# Patient Record
Sex: MALE | Race: WHITE | Employment: FULL TIME | URBAN - METROPOLITAN AREA
[De-identification: names, ages, dates, MRNs, and addresses within clinical notes are randomized per-mention and may not be internally consistent; named-entity substitution may affect disease eponyms.]

---

## 2017-03-27 PROCEDURE — 88305 TISSUE EXAM BY PATHOLOGIST: CPT | Performed by: INTERNAL MEDICINE

## 2017-05-31 PROCEDURE — 82652 VIT D 1 25-DIHYDROXY: CPT | Performed by: INTERNAL MEDICINE

## 2017-09-25 PROCEDURE — 88305 TISSUE EXAM BY PATHOLOGIST: CPT | Performed by: INTERNAL MEDICINE

## 2018-02-08 PROCEDURE — 82570 ASSAY OF URINE CREATININE: CPT | Performed by: INTERNAL MEDICINE

## 2018-02-08 PROCEDURE — 82043 UR ALBUMIN QUANTITATIVE: CPT | Performed by: INTERNAL MEDICINE

## 2018-03-14 PROBLEM — E55.9 VITAMIN D DEFICIENCY: Status: ACTIVE | Noted: 2018-03-14

## 2018-10-25 ENCOUNTER — APPOINTMENT (OUTPATIENT)
Dept: GENERAL RADIOLOGY | Age: 48
End: 2018-10-25
Attending: EMERGENCY MEDICINE
Payer: OTHER MISCELLANEOUS

## 2018-10-25 ENCOUNTER — HOSPITAL ENCOUNTER (EMERGENCY)
Age: 48
Discharge: HOME | End: 2018-10-25
Attending: EMERGENCY MEDICINE
Payer: OTHER MISCELLANEOUS

## 2018-10-25 VITALS
SYSTOLIC BLOOD PRESSURE: 152 MMHG | TEMPERATURE: 98 F | WEIGHT: 235 LBS | OXYGEN SATURATION: 96 % | DIASTOLIC BLOOD PRESSURE: 92 MMHG | RESPIRATION RATE: 18 BRPM | HEART RATE: 87 BPM | BODY MASS INDEX: 31.14 KG/M2 | HEIGHT: 73 IN

## 2018-10-25 DIAGNOSIS — S90.32XA CONTUSION OF LEFT FOOT, INITIAL ENCOUNTER: Primary | ICD-10-CM

## 2018-10-25 PROCEDURE — 73630 X-RAY EXAM OF FOOT: CPT | Performed by: EMERGENCY MEDICINE

## 2018-10-25 PROCEDURE — 99283 EMERGENCY DEPT VISIT LOW MDM: CPT

## 2018-10-25 NOTE — ED INITIAL ASSESSMENT (HPI)
Left foot injury on Monday;  Pt was at work and reported that he dropped a pipe on foot; Pain is not improving; +CMS, +bruising and swelling

## 2018-10-25 NOTE — ED PROVIDER NOTES
Patient Seen in: [facility] Emergency Department In [place]    History   Patient presents with:  Lower Extremity Injury (musculoskeletal)    Stated Complaint: left foot injury while at work    HPI    Patient is a 52-year-old male who presents emergency ro 10/28/2015    Performed by Katherine Alonso MD at [facility] ENDOSCOPY   • ESOPHAGOGASTRODUODENOSCOPY, POSSIBLE BIOPSY, POSSIBLE POLYPECTOMY 28591 N/A 3/27/2017    Performed by Katherine Alonso MD at [address] [place] [address]   • ESOPHAGOGASTRODUODENOSCOPY, [address] [place] [address] 87   Temp 98.4 °F (36.9 °C) (Oral)   Resp 18   Ht 185.4 cm (6' 1\")   Wt 106.6 kg   SpO2 96%   BMI 31.00 kg/m²         Physical Exam  GENERAL: Well-developed, well-nourished male sitting up breathing easily in no apparent distress.   Patient is nontoxic in FOR FOLLOW UP THIS WEEK    Serafin Dandy, MD [place] [phone]    Call in 2 days  [phone]        Medications Prescribed:  Discharge Medication List as of 10/25/2018 10:44 AM

## 2018-12-19 PROCEDURE — 86704 HEP B CORE ANTIBODY TOTAL: CPT | Performed by: INTERNAL MEDICINE

## 2018-12-19 PROCEDURE — 82103 ALPHA-1-ANTITRYPSIN TOTAL: CPT | Performed by: INTERNAL MEDICINE

## 2018-12-19 PROCEDURE — 82390 ASSAY OF CERULOPLASMIN: CPT | Performed by: INTERNAL MEDICINE

## 2018-12-19 PROCEDURE — 82104 ALPHA-1-ANTITRYPSIN PHENO: CPT | Performed by: INTERNAL MEDICINE

## 2018-12-19 PROCEDURE — 86708 HEPATITIS A ANTIBODY: CPT | Performed by: INTERNAL MEDICINE

## 2018-12-19 PROCEDURE — 86256 FLUORESCENT ANTIBODY TITER: CPT | Performed by: INTERNAL MEDICINE

## 2018-12-19 PROCEDURE — 86803 HEPATITIS C AB TEST: CPT | Performed by: INTERNAL MEDICINE

## 2018-12-19 PROCEDURE — 83516 IMMUNOASSAY NONANTIBODY: CPT | Performed by: INTERNAL MEDICINE

## 2018-12-19 PROCEDURE — 86706 HEP B SURFACE ANTIBODY: CPT | Performed by: INTERNAL MEDICINE

## 2019-01-09 PROCEDURE — 82043 UR ALBUMIN QUANTITATIVE: CPT | Performed by: INTERNAL MEDICINE

## 2019-01-09 PROCEDURE — 82570 ASSAY OF URINE CREATININE: CPT | Performed by: INTERNAL MEDICINE

## 2019-01-09 PROCEDURE — 36415 COLL VENOUS BLD VENIPUNCTURE: CPT | Performed by: INTERNAL MEDICINE

## 2019-01-11 PROCEDURE — 88305 TISSUE EXAM BY PATHOLOGIST: CPT | Performed by: INTERNAL MEDICINE

## 2019-09-30 PROCEDURE — 88307 TISSUE EXAM BY PATHOLOGIST: CPT | Performed by: INTERNAL MEDICINE

## 2019-09-30 PROCEDURE — 88313 SPECIAL STAINS GROUP 2: CPT | Performed by: INTERNAL MEDICINE

## 2019-10-25 PROBLEM — Z79.899 ENCOUNTER FOR LONG-TERM (CURRENT) USE OF MEDICATIONS: Status: ACTIVE | Noted: 2019-10-25

## 2019-10-25 PROBLEM — M19.90 CHRONIC INFLAMMATORY ARTHRITIS: Status: ACTIVE | Noted: 2019-10-25

## 2021-07-06 PROBLEM — M19.90 CHRONIC INFLAMMATORY ARTHRITIS: Status: RESOLVED | Noted: 2019-10-25 | Resolved: 2021-07-06

## 2021-07-06 PROBLEM — M06.9 RHEUMATOID ARTHRITIS IN REMISSION (HCC): Status: ACTIVE | Noted: 2021-07-06

## 2021-07-06 PROBLEM — Z79.899 ENCOUNTER FOR LONG-TERM (CURRENT) USE OF MEDICATIONS: Status: RESOLVED | Noted: 2019-10-25 | Resolved: 2021-07-06

## 2022-03-23 PROBLEM — K22.710 BARRETT'S ESOPHAGUS WITH LOW GRADE DYSPLASIA: Status: ACTIVE | Noted: 2022-03-23

## 2022-03-23 PROBLEM — K21.00 GASTROESOPHAGEAL REFLUX DISEASE WITH ESOPHAGITIS WITHOUT HEMORRHAGE: Status: ACTIVE | Noted: 2022-03-23

## 2022-03-23 PROBLEM — K22.711 BARRETT'S ESOPHAGUS WITH HIGH GRADE DYSPLASIA: Status: ACTIVE | Noted: 2022-03-23

## 2022-04-01 ENCOUNTER — LAB ENCOUNTER (OUTPATIENT)
Dept: LAB | Age: 52
End: 2022-04-01
Attending: INTERNAL MEDICINE
Payer: COMMERCIAL

## 2022-04-01 DIAGNOSIS — Z79.899 ENCOUNTER FOR LONG-TERM (CURRENT) USE OF MEDICATIONS: ICD-10-CM

## 2022-04-01 DIAGNOSIS — M19.90 CHRONIC INFLAMMATORY ARTHRITIS: ICD-10-CM

## 2022-04-01 DIAGNOSIS — K21.00 GASTROESOPHAGEAL REFLUX DISEASE WITH ESOPHAGITIS WITHOUT HEMORRHAGE: ICD-10-CM

## 2022-04-02 LAB — SARS-COV-2 RNA RESP QL NAA+PROBE: NOT DETECTED

## 2022-04-04 ENCOUNTER — ANESTHESIA EVENT (OUTPATIENT)
Dept: ENDOSCOPY | Facility: HOSPITAL | Age: 52
End: 2022-04-04
Payer: COMMERCIAL

## 2022-04-04 ENCOUNTER — ANESTHESIA (OUTPATIENT)
Dept: ENDOSCOPY | Facility: HOSPITAL | Age: 52
End: 2022-04-04
Payer: COMMERCIAL

## 2022-04-04 ENCOUNTER — HOSPITAL ENCOUNTER (OUTPATIENT)
Facility: HOSPITAL | Age: 52
Setting detail: HOSPITAL OUTPATIENT SURGERY
Discharge: HOME | End: 2022-04-04
Attending: INTERNAL MEDICINE | Admitting: INTERNAL MEDICINE
Payer: COMMERCIAL

## 2022-04-04 VITALS
WEIGHT: 255 LBS | DIASTOLIC BLOOD PRESSURE: 70 MMHG | RESPIRATION RATE: 18 BRPM | OXYGEN SATURATION: 96 % | HEART RATE: 85 BPM | SYSTOLIC BLOOD PRESSURE: 135 MMHG | BODY MASS INDEX: 33.8 KG/M2 | TEMPERATURE: 98 F | HEIGHT: 73 IN

## 2022-04-04 DIAGNOSIS — K21.00 GASTROESOPHAGEAL REFLUX DISEASE WITH ESOPHAGITIS WITHOUT HEMORRHAGE: Primary | ICD-10-CM

## 2022-04-04 DIAGNOSIS — K22.719 BARRETT'S ESOPHAGUS WITH DYSPLASIA: ICD-10-CM

## 2022-04-04 LAB — GLUCOSE BLD-MCNC: 121 MG/DL (ref 70–99)

## 2022-04-04 PROCEDURE — 0DB38ZX EXCISION OF LOWER ESOPHAGUS, VIA NATURAL OR ARTIFICIAL OPENING ENDOSCOPIC, DIAGNOSTIC: ICD-10-PCS | Performed by: INTERNAL MEDICINE

## 2022-04-04 PROCEDURE — 88305 TISSUE EXAM BY PATHOLOGIST: CPT | Performed by: INTERNAL MEDICINE

## 2022-04-04 PROCEDURE — 82962 GLUCOSE BLOOD TEST: CPT

## 2022-04-04 RX ORDER — SODIUM CHLORIDE, SODIUM LACTATE, POTASSIUM CHLORIDE, CALCIUM CHLORIDE 600; 310; 30; 20 MG/100ML; MG/100ML; MG/100ML; MG/100ML
INJECTION, SOLUTION INTRAVENOUS CONTINUOUS
Status: DISCONTINUED | OUTPATIENT
Start: 2022-04-04 | End: 2022-04-04

## 2022-04-04 RX ORDER — HYDROMORPHONE HYDROCHLORIDE 1 MG/ML
0.4 INJECTION, SOLUTION INTRAMUSCULAR; INTRAVENOUS; SUBCUTANEOUS EVERY 5 MIN PRN
Status: DISCONTINUED | OUTPATIENT
Start: 2022-04-04 | End: 2022-04-04

## 2022-04-04 RX ORDER — DEXTROSE MONOHYDRATE 25 G/50ML
50 INJECTION, SOLUTION INTRAVENOUS
Status: DISCONTINUED | OUTPATIENT
Start: 2022-04-04 | End: 2022-04-04

## 2022-04-04 RX ORDER — HYDROCODONE BITARTRATE AND ACETAMINOPHEN 10; 325 MG/1; MG/1
2 TABLET ORAL AS NEEDED
Status: DISCONTINUED | OUTPATIENT
Start: 2022-04-04 | End: 2022-04-04

## 2022-04-04 RX ORDER — NALOXONE HYDROCHLORIDE 0.4 MG/ML
80 INJECTION, SOLUTION INTRAMUSCULAR; INTRAVENOUS; SUBCUTANEOUS AS NEEDED
Status: DISCONTINUED | OUTPATIENT
Start: 2022-04-04 | End: 2022-04-04

## 2022-04-04 RX ORDER — LIDOCAINE HYDROCHLORIDE 10 MG/ML
INJECTION, SOLUTION EPIDURAL; INFILTRATION; INTRACAUDAL; PERINEURAL AS NEEDED
Status: DISCONTINUED | OUTPATIENT
Start: 2022-04-04 | End: 2022-04-04 | Stop reason: SURG

## 2022-04-04 RX ORDER — NICOTINE POLACRILEX 4 MG
30 LOZENGE BUCCAL
Status: DISCONTINUED | OUTPATIENT
Start: 2022-04-04 | End: 2022-04-04

## 2022-04-04 RX ORDER — METOCLOPRAMIDE HYDROCHLORIDE 5 MG/ML
10 INJECTION INTRAMUSCULAR; INTRAVENOUS AS NEEDED
Status: DISCONTINUED | OUTPATIENT
Start: 2022-04-04 | End: 2022-04-04

## 2022-04-04 RX ORDER — NICOTINE POLACRILEX 4 MG
15 LOZENGE BUCCAL
Status: DISCONTINUED | OUTPATIENT
Start: 2022-04-04 | End: 2022-04-04

## 2022-04-04 RX ORDER — ONDANSETRON 2 MG/ML
4 INJECTION INTRAMUSCULAR; INTRAVENOUS AS NEEDED
Status: DISCONTINUED | OUTPATIENT
Start: 2022-04-04 | End: 2022-04-04

## 2022-04-04 RX ORDER — HYDROCODONE BITARTRATE AND ACETAMINOPHEN 10; 325 MG/1; MG/1
1 TABLET ORAL AS NEEDED
Status: DISCONTINUED | OUTPATIENT
Start: 2022-04-04 | End: 2022-04-04

## 2022-04-04 RX ADMIN — SODIUM CHLORIDE, SODIUM LACTATE, POTASSIUM CHLORIDE, CALCIUM CHLORIDE: 600; 310; 30; 20 INJECTION, SOLUTION INTRAVENOUS at 10:13:00

## 2022-04-04 RX ADMIN — LIDOCAINE HYDROCHLORIDE 25 MG: 10 INJECTION, SOLUTION EPIDURAL; INFILTRATION; INTRACAUDAL; PERINEURAL at 10:12:00

## 2022-04-04 RX ADMIN — SODIUM CHLORIDE, SODIUM LACTATE, POTASSIUM CHLORIDE, CALCIUM CHLORIDE: 600; 310; 30; 20 INJECTION, SOLUTION INTRAVENOUS at 10:55:00

## 2022-04-04 NOTE — OPERATIVE REPORT
OPERATIVE REPORT   PATIENT NAME: Heydi Mayo  MRN: [id number]  DATE OF OPERATION: 4/4/2022  PREOPERATIVE DIAGNOSIS:   1. Roa's esophagus with high-grade dysplasia he was referred for endoscopic management (we discussed with the patient the potential need for endoscopic mucosal resection versus radiofrequency ablation depending on today's exam). POSTOPERATIVE DIAGNOSES:  1. Short segment Roa's with an area of fine nodularity status post endoscopic mucosal resection  PROCEDURE PERFORMED: Upper endoscopy with endoscopic mucosal resection  SURGEON: Clary Alexander MD   MEDICATIONS: None. ANESTHESIA: MAC  CONSENT: Informed and obtained from the patient  SPECIMEN: Distal esophagus  COMPLICATIONS: None immediately apparent  PROCEDURE AND FINDINGS:     After achieving adequate sedation and placing the patient in the left lateral decubitus position the Olympus upper scope was introduced under direct visualization in the esophagus after placement of the distal end clear detachable cap onto the tip of the scope. In the distal part of the esophagus Roa's segment was seen. The top of gastric folds were at 40 cm from the incisors while the squamocolumnar junction at 38 cm from the incisors with tongues of columnar epithelium and few islands. In the proximal aspect of Roa's at approximately 4 o'clock position there appeared to be an an Berhane of Roa's measuring approximately 7 mm in maximum diameter with other areas of tongues of columnar epithelium. The Eccles described above had some fine mucosal irregularity with fine nodularity. This area is suspicious for dysplasia and thus we elected to proceed with endoscopic mucosal resection of this part. There was a 3 cm long sliding hiatal hernia Hill grade 3. The rest of the stomach examination was unremarkable. The bulb and the second portion of the duodenum appeared unremarkable.     The scope at this point was removed and the distal end clear detachable EMR heart oblique Olympus cap was fitted onto the tip of the scope and the scope was introduced under direct visualization in the esophagus where the island described above of Roa's mucosa with fine nodularity was lifted with saline injection utilizing sclerotherapy needle followed by resection over the site with the use of crescent-shaped EMR snare after suction the tissue inside of the EMR. Another area was resected adjacent to it. The extension of the resection was from approximately 4 to 6 o'clock position. Few tiny superficial submucosal oozing blood vessels were cauterized gently with the tip of the 7 Y[place]ut gold probe with complete cessation of blood oozing. The gastric content were suctioned. The specimens were then retrieved with a Baxter net basket and removed. Of note no obvious perforation was seen at the site of resection. IMPRESSION:  1. Findings described above  RECOMMENDATIONS:  1. Await final biopsy results, and most likely radiofrequency ablation in 2 months. 2. Avoid NSAIDs for 7 days. 3. Continue with PPI twice daily. 4. Clear liquid diet for today and then soft for 2 days then as tolerated.   Tea Camargo MD

## 2022-04-05 NOTE — PROGRESS NOTES
4/5/2022  [facility]  [address] [address]    Dear Blanca Avelar,       Here are the biopsy/pathology findings from your recent EGD (Upper  Endoscopy):  1. Roa's esophagus with removal of raised/nodular area with endoscopic mucosal resection. This area showed low and high grade dysplasia. No cancer cells. You will need treatment of the residual flat areas of Roa's esophagus with radiofrequency ablation. Follow-up information:  1.  Schedule upper endoscopy with radiofrequency ablation of residual Roa's esophagus in two months. [phone].   2.  Continue Omeprazole twice daily, 30 minutes before breakfast and dinner. If you need any further assistance, please feel free to call [phone]. Thank you for letting us care for you .     Sincerely ,     Dr Monroy [facility]  [phone]

## 2022-06-03 ENCOUNTER — LAB ENCOUNTER (OUTPATIENT)
Dept: LAB | Age: 52
End: 2022-06-03
Attending: INTERNAL MEDICINE
Payer: COMMERCIAL

## 2022-06-03 DIAGNOSIS — Z01.812 ENCOUNTER FOR PREOPERATIVE SCREENING LABORATORY TESTING FOR COVID-19 VIRUS: ICD-10-CM

## 2022-06-03 DIAGNOSIS — Z20.822 ENCOUNTER FOR PREOPERATIVE SCREENING LABORATORY TESTING FOR COVID-19 VIRUS: ICD-10-CM

## 2022-06-04 LAB — SARS-COV-2 RNA RESP QL NAA+PROBE: NOT DETECTED

## 2022-06-06 ENCOUNTER — ANESTHESIA (OUTPATIENT)
Dept: ENDOSCOPY | Facility: HOSPITAL | Age: 52
End: 2022-06-06
Payer: COMMERCIAL

## 2022-06-06 ENCOUNTER — ANESTHESIA EVENT (OUTPATIENT)
Dept: ENDOSCOPY | Facility: HOSPITAL | Age: 52
End: 2022-06-06
Payer: COMMERCIAL

## 2022-06-06 ENCOUNTER — HOSPITAL ENCOUNTER (OUTPATIENT)
Facility: HOSPITAL | Age: 52
Setting detail: HOSPITAL OUTPATIENT SURGERY
Discharge: HOME | End: 2022-06-06
Attending: INTERNAL MEDICINE | Admitting: INTERNAL MEDICINE
Payer: COMMERCIAL

## 2022-06-06 VITALS
OXYGEN SATURATION: 95 % | HEART RATE: 95 BPM | DIASTOLIC BLOOD PRESSURE: 78 MMHG | BODY MASS INDEX: 33.8 KG/M2 | HEIGHT: 73 IN | RESPIRATION RATE: 16 BRPM | SYSTOLIC BLOOD PRESSURE: 133 MMHG | WEIGHT: 255 LBS | TEMPERATURE: 98 F

## 2022-06-06 DIAGNOSIS — Z01.812 ENCOUNTER FOR PREOPERATIVE SCREENING LABORATORY TESTING FOR COVID-19 VIRUS: Primary | ICD-10-CM

## 2022-06-06 DIAGNOSIS — K22.711 BARRETT'S ESOPHAGUS WITH HIGH GRADE DYSPLASIA: ICD-10-CM

## 2022-06-06 DIAGNOSIS — Z20.822 ENCOUNTER FOR PREOPERATIVE SCREENING LABORATORY TESTING FOR COVID-19 VIRUS: Primary | ICD-10-CM

## 2022-06-06 LAB
GLUCOSE BLD-MCNC: 138 MG/DL (ref 70–99)
GLUCOSE BLD-MCNC: 144 MG/DL (ref 70–99)

## 2022-06-06 PROCEDURE — 82962 GLUCOSE BLOOD TEST: CPT

## 2022-06-06 PROCEDURE — 0D538ZZ DESTRUCTION OF LOWER ESOPHAGUS, VIA NATURAL OR ARTIFICIAL OPENING ENDOSCOPIC: ICD-10-PCS | Performed by: INTERNAL MEDICINE

## 2022-06-06 RX ORDER — LIDOCAINE HYDROCHLORIDE 10 MG/ML
INJECTION, SOLUTION EPIDURAL; INFILTRATION; INTRACAUDAL; PERINEURAL AS NEEDED
Status: DISCONTINUED | OUTPATIENT
Start: 2022-06-06 | End: 2022-06-06 | Stop reason: SURG

## 2022-06-06 RX ORDER — NICOTINE POLACRILEX 4 MG
15 LOZENGE BUCCAL
Status: DISCONTINUED | OUTPATIENT
Start: 2022-06-06 | End: 2022-06-06

## 2022-06-06 RX ORDER — MULTIVIT-MIN/IRON/FOLIC ACID/K 18-600-40
CAPSULE ORAL
COMMUNITY

## 2022-06-06 RX ORDER — NICOTINE POLACRILEX 4 MG
30 LOZENGE BUCCAL
Status: DISCONTINUED | OUTPATIENT
Start: 2022-06-06 | End: 2022-06-06

## 2022-06-06 RX ORDER — SODIUM CHLORIDE, SODIUM LACTATE, POTASSIUM CHLORIDE, CALCIUM CHLORIDE 600; 310; 30; 20 MG/100ML; MG/100ML; MG/100ML; MG/100ML
INJECTION, SOLUTION INTRAVENOUS CONTINUOUS
Status: DISCONTINUED | OUTPATIENT
Start: 2022-06-06 | End: 2022-06-06

## 2022-06-06 RX ORDER — ONDANSETRON 2 MG/ML
4 INJECTION INTRAMUSCULAR; INTRAVENOUS AS NEEDED
Status: DISCONTINUED | OUTPATIENT
Start: 2022-06-06 | End: 2022-06-06

## 2022-06-06 RX ORDER — NALOXONE HYDROCHLORIDE 0.4 MG/ML
80 INJECTION, SOLUTION INTRAMUSCULAR; INTRAVENOUS; SUBCUTANEOUS AS NEEDED
Status: DISCONTINUED | OUTPATIENT
Start: 2022-06-06 | End: 2022-06-06

## 2022-06-06 RX ORDER — DEXTROSE MONOHYDRATE 25 G/50ML
50 INJECTION, SOLUTION INTRAVENOUS
Status: DISCONTINUED | OUTPATIENT
Start: 2022-06-06 | End: 2022-06-06

## 2022-06-06 RX ADMIN — LIDOCAINE HYDROCHLORIDE 30 MG: 10 INJECTION, SOLUTION EPIDURAL; INFILTRATION; INTRACAUDAL; PERINEURAL at 10:55:00

## 2022-06-06 RX ADMIN — SODIUM CHLORIDE, SODIUM LACTATE, POTASSIUM CHLORIDE, CALCIUM CHLORIDE: 600; 310; 30; 20 INJECTION, SOLUTION INTRAVENOUS at 11:15:00

## 2022-06-06 RX ADMIN — SODIUM CHLORIDE, SODIUM LACTATE, POTASSIUM CHLORIDE, CALCIUM CHLORIDE: 600; 310; 30; 20 INJECTION, SOLUTION INTRAVENOUS at 10:48:00

## 2022-06-06 NOTE — OPERATIVE REPORT
OPERATIVE REPORT   PATIENT NAME: Katerin Duffy  MRN: [id number]  DATE OF OPERATION: 6/6/2022  PREOPERATIVE DIAGNOSIS:   Roa's esophagus with high-grade dysplasia. He underwent endoscopic mucosal resection in April of this month. He is here today for follow-up upper endoscopy and radiofrequency ablation of residual Roa's. POSTOPERATIVE DIAGNOSES:  1. Healed prior EMR site. 2. Short segment Roa's  PROCEDURE PERFORMED: Upper endoscopy with radiofrequency ablation  SURGEON: Jagdish Garza MD   MEDICATIONS: None. ANESTHESIA: MAC  CONSENT: Informed and obtained from the patient  SPECIMEN: None  COMPLICATIONS: None immediately apparent  PROCEDURE AND FINDINGS:   After achieving adequate sedation and placing the patient in the left lateral decubitus position the Olympus upper scope was introduced under direct visualization in the esophagus after placement of the distal end detachable cap. Once again sliding hiatal hernia was seen, Hill grade 3. There were multiple tongues of columnar epithelium without obvious suspicious areas. The esophageal mucosa was examined with narrowband imaging as well as high definition white light. This time examination was overall unremarkable. The bulb and the second portion of the duodenum appeared unremarkable. The scope at this point was removed and the 60 Medtronic  focal RFA device was fitted onto the tip of the scope the scope was introduced on again under direct visualization in the esophagus. The visible area of Roa's were treated. Coagulum was scraped off a second treatment was applied. The scope at this point was removed. The gastric content were suctioned at the beginning and at the end of the procedure. IMPRESSION:  1. Findings described above  RECOMMENDATIONS:  1. Clear liquid diet for 24 hours then soft for 5 days and as tolerated. 2. Carafate 1 g 4 times a day for 14 days. 3. Continue with PPI twice a day.   4. Repeat upper endoscopy and 2 months for evaluation and assess the need for repeat RFA.   Babar Chase MD

## 2022-06-06 NOTE — ANESTHESIA POSTPROCEDURE EVALUATION
[facility] Patient Status:  Hospital Outpatient Surgery   Age/Gender 47year old male MRN [id number]   Location [address] [address] Attending Siva Carey MD   Hosp Day # 0 PCP Annie Laboy MD       Anesthesia Post-op Note    ESOPHAGOGASTRODUODENOSCOPY (EGD) with radiofrequency ablation    Procedure Summary     Date: 06/06/22 Room / Location: [address] [place] [address] ENDOSCOPY 02 / [address] [facility] ENDOSCOPY    Anesthesia Start: 8082 Anesthesia Stop: 1115    Procedure: ESOPHAGOGASTRODUODENOSCOPY (EGD) with radiofrequency ablation (N/A ) Diagnosis:       Roa's esophagus with high grade dysplasia      (hiatal hernia, short segmented barretts )    Surgeons: Siva Carey MD Anesthesiologist: Pamela Antoine MD    Anesthesia Type: MAC ASA Status: 3          Anesthesia Type: MAC    Vitals Value Taken Time   /98 06/06/22 1116   Temp  06/06/22 1116   Pulse 95 06/06/22 1116   Resp 16 06/06/22 1116   SpO2 97 06/06/22 1116       Patient Location: Endoscopy    Anesthesia Type: MAC    Airway Patency: patent    Postop Pain Control: adequate    Mental Status: mildly sedated but able to meaningfully participate in the post-anesthesia evaluation    Nausea/Vomiting: none    Cardiopulmonary/Hydration status: stable euvolemic    Complications: no apparent anesthesia related complications    Postop vital signs: stable    Dental Exam: Unchanged from Postop

## 2022-08-04 RX ORDER — ATORVASTATIN CALCIUM 10 MG/1
10 TABLET, FILM COATED ORAL DAILY
COMMUNITY

## 2022-08-05 ENCOUNTER — LAB ENCOUNTER (OUTPATIENT)
Dept: LAB | Age: 52
End: 2022-08-05
Attending: INTERNAL MEDICINE
Payer: COMMERCIAL

## 2022-08-05 DIAGNOSIS — Z01.812 ENCOUNTER FOR PREPROCEDURE SCREENING LABORATORY TESTING FOR COVID-19: ICD-10-CM

## 2022-08-05 DIAGNOSIS — Z20.822 ENCOUNTER FOR PREPROCEDURE SCREENING LABORATORY TESTING FOR COVID-19: ICD-10-CM

## 2022-08-06 LAB — SARS-COV-2 RNA RESP QL NAA+PROBE: NOT DETECTED

## 2022-08-08 ENCOUNTER — HOSPITAL ENCOUNTER (OUTPATIENT)
Facility: HOSPITAL | Age: 52
Setting detail: HOSPITAL OUTPATIENT SURGERY
Discharge: HOME | End: 2022-08-08
Attending: INTERNAL MEDICINE | Admitting: INTERNAL MEDICINE
Payer: COMMERCIAL

## 2022-08-08 ENCOUNTER — ANESTHESIA EVENT (OUTPATIENT)
Dept: ENDOSCOPY | Facility: HOSPITAL | Age: 52
End: 2022-08-08
Payer: COMMERCIAL

## 2022-08-08 ENCOUNTER — ANESTHESIA (OUTPATIENT)
Dept: ENDOSCOPY | Facility: HOSPITAL | Age: 52
End: 2022-08-08
Payer: COMMERCIAL

## 2022-08-08 VITALS
DIASTOLIC BLOOD PRESSURE: 84 MMHG | WEIGHT: 230 LBS | BODY MASS INDEX: 30.48 KG/M2 | SYSTOLIC BLOOD PRESSURE: 147 MMHG | RESPIRATION RATE: 19 BRPM | HEART RATE: 69 BPM | OXYGEN SATURATION: 100 % | TEMPERATURE: 98 F | HEIGHT: 73 IN

## 2022-08-08 DIAGNOSIS — Z01.812 ENCOUNTER FOR PREPROCEDURE SCREENING LABORATORY TESTING FOR COVID-19: Primary | ICD-10-CM

## 2022-08-08 DIAGNOSIS — Z20.822 ENCOUNTER FOR PREPROCEDURE SCREENING LABORATORY TESTING FOR COVID-19: Primary | ICD-10-CM

## 2022-08-08 PROCEDURE — 0D538ZZ DESTRUCTION OF LOWER ESOPHAGUS, VIA NATURAL OR ARTIFICIAL OPENING ENDOSCOPIC: ICD-10-PCS | Performed by: INTERNAL MEDICINE

## 2022-08-08 RX ORDER — SODIUM CHLORIDE, SODIUM LACTATE, POTASSIUM CHLORIDE, CALCIUM CHLORIDE 600; 310; 30; 20 MG/100ML; MG/100ML; MG/100ML; MG/100ML
INJECTION, SOLUTION INTRAVENOUS CONTINUOUS
Status: DISCONTINUED | OUTPATIENT
Start: 2022-08-08 | End: 2022-08-08

## 2022-08-08 RX ORDER — LIDOCAINE HYDROCHLORIDE 10 MG/ML
INJECTION, SOLUTION EPIDURAL; INFILTRATION; INTRACAUDAL; PERINEURAL AS NEEDED
Status: DISCONTINUED | OUTPATIENT
Start: 2022-08-08 | End: 2022-08-08 | Stop reason: SURG

## 2022-08-08 RX ORDER — NICOTINE POLACRILEX 4 MG
15 LOZENGE BUCCAL
Status: DISCONTINUED | OUTPATIENT
Start: 2022-08-08 | End: 2022-08-08

## 2022-08-08 RX ORDER — NALOXONE HYDROCHLORIDE 0.4 MG/ML
80 INJECTION, SOLUTION INTRAMUSCULAR; INTRAVENOUS; SUBCUTANEOUS AS NEEDED
Status: DISCONTINUED | OUTPATIENT
Start: 2022-08-08 | End: 2022-08-08

## 2022-08-08 RX ORDER — ONDANSETRON 2 MG/ML
4 INJECTION INTRAMUSCULAR; INTRAVENOUS AS NEEDED
Status: DISCONTINUED | OUTPATIENT
Start: 2022-08-08 | End: 2022-08-08

## 2022-08-08 RX ORDER — DEXTROSE MONOHYDRATE 25 G/50ML
50 INJECTION, SOLUTION INTRAVENOUS
Status: DISCONTINUED | OUTPATIENT
Start: 2022-08-08 | End: 2022-08-08

## 2022-08-08 RX ORDER — NICOTINE POLACRILEX 4 MG
30 LOZENGE BUCCAL
Status: DISCONTINUED | OUTPATIENT
Start: 2022-08-08 | End: 2022-08-08

## 2022-08-08 RX ADMIN — SODIUM CHLORIDE, SODIUM LACTATE, POTASSIUM CHLORIDE, CALCIUM CHLORIDE: 600; 310; 30; 20 INJECTION, SOLUTION INTRAVENOUS at 09:24:00

## 2022-08-08 RX ADMIN — LIDOCAINE HYDROCHLORIDE 30 MG: 10 INJECTION, SOLUTION EPIDURAL; INFILTRATION; INTRACAUDAL; PERINEURAL at 09:08:00

## 2022-08-08 NOTE — ANESTHESIA POSTPROCEDURE EVALUATION
[address] [facility] Patient Status:  Hospital Outpatient Surgery   Age/Gender 47year old male MRN [id number]   Location [address] [address] Attending Krystian Varghese MD   Hosp Day # 0 PCP Haider Soares MD       Anesthesia Post-op Note    ESOPHAGOGASTRODUODENOSCOPY WITH RADIO FREQUENCY ABLATION    Procedure Summary     Date: 08/08/22 Room / Location: [address] [place] [address] ENDOSCOPY 02 / 1404 [facility] ENDOSCOPY    Anesthesia Start: 7567 Anesthesia Stop: 1109    Procedure: ESOPHAGOGASTRODUODENOSCOPY WITH RADIO FREQUENCY ABLATION (N/A ) Diagnosis: (Hiatal hernia, Barretts esophagus)    Surgeons: Krystian Varghese MD Anesthesiologist: Dalton Dela Cruz MD    Anesthesia Type: MAC ASA Status: 3          Anesthesia Type: MAC    Vitals Value Taken Time   /74 08/08/22 0925   Temp  08/08/22 0925   Pulse 87 08/08/22 0925   Resp 16 08/08/22 0925   SpO2 95 08/08/22 0925       Patient Location: Endoscopy    Anesthesia Type: MAC    Airway Patency: patent    Postop Pain Control: adequate    Mental Status: mildly sedated but able to meaningfully participate in the post-anesthesia evaluation    Nausea/Vomiting: none    Cardiopulmonary/Hydration status: stable euvolemic    Complications: no apparent anesthesia related complications    Postop vital signs: stable    Dental Exam: Unchanged from Postop

## 2022-08-08 NOTE — OPERATIVE REPORT
OPERATIVE REPORT   PATIENT NAME: Dontrell Grover  MRN: [id number]  DATE OF OPERATION: 8/8/2022  PREOPERATIVE DIAGNOSIS:   1. History of high-grade dysplasia arising in the setting of short segment Roa's status post endoscopic mucosal resection in April 2022 by myself followed by radiofrequency ablation of residual Roa's area in June 2022. He is here for follow-up upper endoscopy  POSTOPERATIVE DIAGNOSES:  1. Few residual islands of columnar epithelium status post radiofrequency ablation. 2. Sliding hiatal hernia, Hill grade 3  PROCEDURE PERFORMED:   Upper endoscopy with radiofrequency ablation utilizing the through-the-scope channel RFA catheter  SURGEON: Talita Rhodes MD   MEDICATIONS:  none    ANESTHESIA: MAC  CONSENT: Informed and obtained from the patient  SPECIMEN: None  COMPLICATIONS: None immediately apparent  PROCEDURE AND FINDINGS:   After the risks and benefits of the procedure were discussed with the patient and all questions were answered, the patient signed informed consent for the procedure. I discussed the rationale for the procedure as well as the risks and benefits, with the risks including but not limited to bleeding, perforation, medication adverse event and missed lesions. He was placed in the left lateral position and once an adequate level of  sedation was achieved, the lubricated tip of an Olympus video gastroscope was introduced into the mouth and the esophagus was intubated under direct visualization. A complete examination of the esophagus, stomach and duodenum was performed including retroflexion in the stomach to view the cardia and fundus. The endoscope was straightened and removed, and the procedure was completed. The patient tolerated the procedure well. There were no immediate complications. The patient was given a written copy of their results at discharge. FINDINGS:  1.   The distal part of the esophagus there were 2 areas of islands of columnar epithelium 1 is approximately 1 o'clock position measure approximately 4 mm maximum diameter and the second 1 at 5 o'clock position in the linear fashion measuring approximately 3-4 mm in maximal length. Both areas were treated with through-the-scope RFA channel device. Coagulum was scraped off a second treatment was applied. Hill grade 3 gastroesophageal junction flap valve. 2. Normal stomach throughout with no evidence of ulcers, masses or other abnormalities. Retroflexion revealed a normal cardia and fundus. The antrum was normal and the pylorus was patent. 3. Normal duodenum to the second portion with no ulcers or masses seen. IMPRESSION:  1. Findings described above  RECOMMENDATIONS:  1. Start with full liquid diet for today and then advance to soft tomorrow then as tolerated  2. Repeat upper endoscopy with possible endoscopic mucosal resection of any residual islands in 2 months.   Marian Harmon MD

## 2022-10-07 ENCOUNTER — LAB ENCOUNTER (OUTPATIENT)
Dept: LAB | Age: 52
End: 2022-10-07
Attending: INTERNAL MEDICINE
Payer: COMMERCIAL

## 2022-10-07 DIAGNOSIS — Z01.818 PRE-OP TESTING: ICD-10-CM

## 2022-10-08 LAB — SARS-COV-2 RNA RESP QL NAA+PROBE: NOT DETECTED

## 2022-10-10 ENCOUNTER — ANESTHESIA (OUTPATIENT)
Dept: ENDOSCOPY | Facility: HOSPITAL | Age: 52
End: 2022-10-10
Payer: COMMERCIAL

## 2022-10-10 ENCOUNTER — HOSPITAL ENCOUNTER (OUTPATIENT)
Facility: HOSPITAL | Age: 52
Setting detail: HOSPITAL OUTPATIENT SURGERY
Discharge: HOME | End: 2022-10-10
Attending: INTERNAL MEDICINE | Admitting: INTERNAL MEDICINE
Payer: COMMERCIAL

## 2022-10-10 ENCOUNTER — ANESTHESIA EVENT (OUTPATIENT)
Dept: ENDOSCOPY | Facility: HOSPITAL | Age: 52
End: 2022-10-10
Payer: COMMERCIAL

## 2022-10-10 VITALS
OXYGEN SATURATION: 99 % | WEIGHT: 230 LBS | TEMPERATURE: 98 F | HEART RATE: 67 BPM | BODY MASS INDEX: 30.48 KG/M2 | HEIGHT: 73 IN | RESPIRATION RATE: 20 BRPM | SYSTOLIC BLOOD PRESSURE: 152 MMHG | DIASTOLIC BLOOD PRESSURE: 92 MMHG

## 2022-10-10 DIAGNOSIS — Z01.818 PRE-OP TESTING: Primary | ICD-10-CM

## 2022-10-10 LAB — GLUCOSE BLD-MCNC: 121 MG/DL (ref 70–99)

## 2022-10-10 PROCEDURE — 82962 GLUCOSE BLOOD TEST: CPT

## 2022-10-10 PROCEDURE — 0D538ZZ DESTRUCTION OF LOWER ESOPHAGUS, VIA NATURAL OR ARTIFICIAL OPENING ENDOSCOPIC: ICD-10-PCS | Performed by: INTERNAL MEDICINE

## 2022-10-10 RX ORDER — SODIUM CHLORIDE, SODIUM LACTATE, POTASSIUM CHLORIDE, CALCIUM CHLORIDE 600; 310; 30; 20 MG/100ML; MG/100ML; MG/100ML; MG/100ML
INJECTION, SOLUTION INTRAVENOUS CONTINUOUS
Status: DISCONTINUED | OUTPATIENT
Start: 2022-10-10 | End: 2022-10-10

## 2022-10-10 RX ORDER — DEXTROSE MONOHYDRATE 25 G/50ML
50 INJECTION, SOLUTION INTRAVENOUS
Status: DISCONTINUED | OUTPATIENT
Start: 2022-10-10 | End: 2022-10-10

## 2022-10-10 RX ORDER — SULFASALAZINE 500 MG/1
TABLET ORAL 4 TIMES DAILY
COMMUNITY

## 2022-10-10 RX ORDER — NICOTINE POLACRILEX 4 MG
30 LOZENGE BUCCAL
Status: DISCONTINUED | OUTPATIENT
Start: 2022-10-10 | End: 2022-10-10

## 2022-10-10 RX ORDER — NICOTINE POLACRILEX 4 MG
15 LOZENGE BUCCAL
Status: DISCONTINUED | OUTPATIENT
Start: 2022-10-10 | End: 2022-10-10

## 2022-10-10 RX ORDER — LIDOCAINE HYDROCHLORIDE 10 MG/ML
INJECTION, SOLUTION EPIDURAL; INFILTRATION; INTRACAUDAL; PERINEURAL AS NEEDED
Status: DISCONTINUED | OUTPATIENT
Start: 2022-10-10 | End: 2022-10-10 | Stop reason: SURG

## 2022-10-10 RX ADMIN — LIDOCAINE HYDROCHLORIDE 100 MG: 10 INJECTION, SOLUTION EPIDURAL; INFILTRATION; INTRACAUDAL; PERINEURAL at 08:01:00

## 2022-10-10 NOTE — OPERATIVE REPORT
OPERATIVE REPORT   PATIENT NAME: Lien Tucker  MRN: [id number]  DATE OF OPERATION: 10/10/2022    PREOPERATIVE DIAGNOSIS:     Hx/o short segment Roa's with  HGD s/p EMR on  Aug. 8th,  2022    POSTOPERATIVE DIAGNOSES:  1. Healed, prior EMR site in the distal esophagus with irregular Z-line, status post radiofrequency ablation  PROCEDURE PERFORMED: Upper endoscopy with radiofrequency ablation  SURGEON: Paola Blount MD   MEDICATIONS:  none    ANESTHESIA: MAC  CONSENT: Informed and obtained from the patient  SPECIMEN: None  COMPLICATIONS: None immediately apparent  PROCEDURE AND FINDINGS:   After the risks and benefits of the procedure were discussed with the patient and all questions were answered, the patient signed informed consent for the procedure. I discussed the rationale for the procedure as well as the risks and benefits, with the risks including but not limited to bleeding, perforation, medication adverse event and missed lesions. He was placed in the left lateral position and once an adequate level of  sedation was achieved, the lubricated tip of an Olympus video gastroscope was introduced into the mouth and the esophagus was intubated under direct visualization. A complete examination of the esophagus, stomach and duodenum was performed including retroflexion in the stomach to view the cardia and fundus. The endoscope was straightened and removed, and the procedure was completed. The patient tolerated the procedure well. There were no immediate complications. The patient was given a written copy of their results at discharge. The distal end clear detachable cap was fitted onto the tip of the scope prior to inserting it into the esophagus. FINDINGS:  1. The esophageal examination did not reveal any obvious inflammation or ulceration.   The previously performed endoscopic mucosal resection site appeared to be well-healed, residual tiny islands of columnar epithelium in the distal esophagus were seen. Irregular squamocolumnar junction was noted. The esophageal mucosa was examined with narrowband imaging as well as high definition white light. 3 cm long sliding hiatal hernia was once again identified. 2. Normal stomach throughout with no evidence of ulcers, masses or other abnormalities. Retroflexion revealed a normal cardia and fundus. The antrum was normal and the pylorus was patent. 3. Normal duodenum to the second portion with no ulcers or masses seen. The RFA focal through-the-scope channel device was inserted through the working channel of the scope and the squamocolumnar junction was treated in a circumferential fashion to ablate any residual Roa's tissue. Coagulum was scraped off and second treatment was applied. The gastric content were then suctioned and the scope was removed. IMPRESSION:  1. Findings described above  RECOMMENDATIONS:  1. Clear liquid diet for 24 hours then soft for 4 days and as tolerated. 2. Carafate 1 g 4 times a day for 14 days. 3. Continue PPI twice daily. 4. Repeat upper endoscopy with biopsy/possible RFA in 2 months. 5. Tylenol 3 and or viscous lidocaine as needed.   Talita Rhodes MD

## 2022-10-10 NOTE — ANESTHESIA POSTPROCEDURE EVALUATION
[address] [facility] Patient Status:  Hospital Outpatient Surgery   Age/Gender 47year old male MRN [id number]   Location [address] [address] Attending Mel Fernandes MD   Hosp Day # 0 PCP Danni Mixon MD       Anesthesia Post-op Note    ESOPHAGOGASTRODUODENOSCOPY WITH RADIO FREQUENCY ABLATION    Procedure Summary     Date: 10/10/22 Room / Location: [facility] ENDOSCOPY 02 / [facility] ENDOSCOPY    Anesthesia Start: 1877 Anesthesia Stop: 8981    Procedure: ESOPHAGOGASTRODUODENOSCOPY WITH RADIO FREQUENCY ABLATION (N/A ) Diagnosis: (HIATAL HERNIA, IRREGULAR Z-LINE)    Surgeons: Mel Fernandes MD Anesthesiologist: Fatuma Roger MD    Anesthesia Type: MAC ASA Status: 2          Anesthesia Type: MAC    Vitals Value Taken Time   /86 10/10/22 0828   Temp 98 10/10/22 0829   Pulse 74 10/10/22 0828   Resp 18 10/10/22 0829   SpO2 97 % 10/10/22 0828   Vitals shown include unvalidated device data. Patient Location: Endoscopy    Anesthesia Type: MAC    Airway Patency: patent and extubated    Postop Pain Control: adequate    Mental Status: mildly sedated but able to meaningfully participate in the post-anesthesia evaluation    Nausea/Vomiting: none    Cardiopulmonary/Hydration status: stable euvolemic    Complications: no apparent anesthesia related complications    Postop vital signs: stable    Dental Exam: Unchanged from Preop    Patient to be discharged from PACU when criteria met.

## 2022-12-09 ENCOUNTER — LAB ENCOUNTER (OUTPATIENT)
Dept: LAB | Age: 52
End: 2022-12-09
Attending: INTERNAL MEDICINE
Payer: COMMERCIAL

## 2022-12-09 DIAGNOSIS — M19.90 CHRONIC INFLAMMATORY ARTHRITIS: ICD-10-CM

## 2022-12-09 DIAGNOSIS — Z01.812 ENCOUNTER FOR PREOPERATIVE SCREENING LABORATORY TESTING FOR COVID-19 VIRUS: ICD-10-CM

## 2022-12-09 DIAGNOSIS — Z20.822 ENCOUNTER FOR PREOPERATIVE SCREENING LABORATORY TESTING FOR COVID-19 VIRUS: ICD-10-CM

## 2022-12-09 DIAGNOSIS — Z79.899 ENCOUNTER FOR LONG-TERM (CURRENT) USE OF MEDICATIONS: ICD-10-CM

## 2022-12-09 LAB
ALBUMIN SERPL-MCNC: 4.1 G/DL (ref 3.4–5)
ALP LIVER SERPL-CCNC: 97 U/L
ALT SERPL-CCNC: 33 U/L
AST SERPL-CCNC: 17 U/L (ref 15–37)
BASOPHILS # BLD AUTO: 0.03 X10(3) UL (ref 0–0.2)
BASOPHILS NFR BLD AUTO: 0.7 %
BILIRUB DIRECT SERPL-MCNC: 0.1 MG/DL (ref 0–0.2)
BILIRUB SERPL-MCNC: 0.4 MG/DL (ref 0.1–2)
EOSINOPHIL # BLD AUTO: 0.01 X10(3) UL (ref 0–0.7)
EOSINOPHIL NFR BLD AUTO: 0.2 %
ERYTHROCYTE [DISTWIDTH] IN BLOOD BY AUTOMATED COUNT: 12.8 %
ERYTHROCYTE [SEDIMENTATION RATE] IN BLOOD: 10 MM/HR
HCT VFR BLD AUTO: 37.2 %
HGB BLD-MCNC: 13.5 G/DL
IMM GRANULOCYTES # BLD AUTO: 0.02 X10(3) UL (ref 0–1)
IMM GRANULOCYTES NFR BLD: 0.4 %
LYMPHOCYTES # BLD AUTO: 1.63 X10(3) UL (ref 1–4)
LYMPHOCYTES NFR BLD AUTO: 36.5 %
MCH RBC QN AUTO: 34.4 PG (ref 26–34)
MCHC RBC AUTO-ENTMCNC: 36.3 G/DL (ref 31–37)
MCV RBC AUTO: 94.9 FL
MONOCYTES # BLD AUTO: 0.44 X10(3) UL (ref 0.1–1)
MONOCYTES NFR BLD AUTO: 9.9 %
NEUTROPHILS # BLD AUTO: 2.33 X10 (3) UL (ref 1.5–7.7)
NEUTROPHILS # BLD AUTO: 2.33 X10(3) UL (ref 1.5–7.7)
NEUTROPHILS NFR BLD AUTO: 52.3 %
PLATELET # BLD AUTO: 226 10(3)UL (ref 150–450)
PROT SERPL-MCNC: 7.8 G/DL (ref 6.4–8.2)
RBC # BLD AUTO: 3.92 X10(6)UL
WBC # BLD AUTO: 4.5 X10(3) UL (ref 4–11)

## 2022-12-09 PROCEDURE — 36415 COLL VENOUS BLD VENIPUNCTURE: CPT

## 2022-12-09 PROCEDURE — 85652 RBC SED RATE AUTOMATED: CPT

## 2022-12-09 PROCEDURE — 85025 COMPLETE CBC W/AUTO DIFF WBC: CPT

## 2022-12-09 PROCEDURE — 80076 HEPATIC FUNCTION PANEL: CPT

## 2022-12-10 LAB — SARS-COV-2 RNA RESP QL NAA+PROBE: NOT DETECTED

## 2022-12-12 ENCOUNTER — ANESTHESIA EVENT (OUTPATIENT)
Dept: ENDOSCOPY | Facility: HOSPITAL | Age: 52
End: 2022-12-12
Payer: COMMERCIAL

## 2022-12-12 ENCOUNTER — HOSPITAL ENCOUNTER (OUTPATIENT)
Facility: HOSPITAL | Age: 52
Setting detail: HOSPITAL OUTPATIENT SURGERY
Discharge: HOME | End: 2022-12-12
Attending: INTERNAL MEDICINE | Admitting: INTERNAL MEDICINE
Payer: COMMERCIAL

## 2022-12-12 ENCOUNTER — ANESTHESIA (OUTPATIENT)
Dept: ENDOSCOPY | Facility: HOSPITAL | Age: 52
End: 2022-12-12
Payer: COMMERCIAL

## 2022-12-12 VITALS
BODY MASS INDEX: 30.48 KG/M2 | SYSTOLIC BLOOD PRESSURE: 137 MMHG | HEIGHT: 73 IN | DIASTOLIC BLOOD PRESSURE: 82 MMHG | TEMPERATURE: 98 F | HEART RATE: 70 BPM | WEIGHT: 230 LBS | RESPIRATION RATE: 16 BRPM | OXYGEN SATURATION: 98 %

## 2022-12-12 DIAGNOSIS — Z01.812 ENCOUNTER FOR PREOPERATIVE SCREENING LABORATORY TESTING FOR COVID-19 VIRUS: Primary | ICD-10-CM

## 2022-12-12 DIAGNOSIS — Z20.822 ENCOUNTER FOR PREOPERATIVE SCREENING LABORATORY TESTING FOR COVID-19 VIRUS: Primary | ICD-10-CM

## 2022-12-12 LAB — GLUCOSE BLD-MCNC: 198 MG/DL (ref 70–99)

## 2022-12-12 PROCEDURE — 88305 TISSUE EXAM BY PATHOLOGIST: CPT | Performed by: INTERNAL MEDICINE

## 2022-12-12 PROCEDURE — 0DB38ZX EXCISION OF LOWER ESOPHAGUS, VIA NATURAL OR ARTIFICIAL OPENING ENDOSCOPIC, DIAGNOSTIC: ICD-10-PCS | Performed by: INTERNAL MEDICINE

## 2022-12-12 PROCEDURE — 0DB48ZX EXCISION OF ESOPHAGOGASTRIC JUNCTION, VIA NATURAL OR ARTIFICIAL OPENING ENDOSCOPIC, DIAGNOSTIC: ICD-10-PCS | Performed by: INTERNAL MEDICINE

## 2022-12-12 PROCEDURE — 82962 GLUCOSE BLOOD TEST: CPT

## 2022-12-12 RX ORDER — NICOTINE POLACRILEX 4 MG
15 LOZENGE BUCCAL
Status: DISCONTINUED | OUTPATIENT
Start: 2022-12-12 | End: 2022-12-12

## 2022-12-12 RX ORDER — SODIUM CHLORIDE, SODIUM LACTATE, POTASSIUM CHLORIDE, CALCIUM CHLORIDE 600; 310; 30; 20 MG/100ML; MG/100ML; MG/100ML; MG/100ML
INJECTION, SOLUTION INTRAVENOUS CONTINUOUS
Status: DISCONTINUED | OUTPATIENT
Start: 2022-12-12 | End: 2022-12-12

## 2022-12-12 RX ORDER — NALOXONE HYDROCHLORIDE 0.4 MG/ML
80 INJECTION, SOLUTION INTRAMUSCULAR; INTRAVENOUS; SUBCUTANEOUS AS NEEDED
OUTPATIENT
Start: 2022-12-12 | End: 2022-12-12

## 2022-12-12 RX ORDER — NICOTINE POLACRILEX 4 MG
30 LOZENGE BUCCAL
Status: DISCONTINUED | OUTPATIENT
Start: 2022-12-12 | End: 2022-12-12

## 2022-12-12 RX ORDER — SODIUM CHLORIDE, SODIUM LACTATE, POTASSIUM CHLORIDE, CALCIUM CHLORIDE 600; 310; 30; 20 MG/100ML; MG/100ML; MG/100ML; MG/100ML
INJECTION, SOLUTION INTRAVENOUS CONTINUOUS
OUTPATIENT
Start: 2022-12-12

## 2022-12-12 RX ORDER — LIDOCAINE HYDROCHLORIDE 20 MG/ML
INJECTION, SOLUTION EPIDURAL; INFILTRATION; INTRACAUDAL; PERINEURAL AS NEEDED
Status: DISCONTINUED | OUTPATIENT
Start: 2022-12-12 | End: 2022-12-12 | Stop reason: SURG

## 2022-12-12 RX ORDER — LABETALOL HYDROCHLORIDE 5 MG/ML
INJECTION, SOLUTION INTRAVENOUS AS NEEDED
Status: DISCONTINUED | OUTPATIENT
Start: 2022-12-12 | End: 2022-12-12 | Stop reason: SURG

## 2022-12-12 RX ORDER — DEXTROSE MONOHYDRATE 25 G/50ML
50 INJECTION, SOLUTION INTRAVENOUS
Status: DISCONTINUED | OUTPATIENT
Start: 2022-12-12 | End: 2022-12-12

## 2022-12-12 RX ADMIN — LIDOCAINE HYDROCHLORIDE 100 MG: 20 INJECTION, SOLUTION EPIDURAL; INFILTRATION; INTRACAUDAL; PERINEURAL at 08:16:00

## 2022-12-12 RX ADMIN — LABETALOL HYDROCHLORIDE 10 MG: 5 INJECTION, SOLUTION INTRAVENOUS at 08:22:00

## 2022-12-12 NOTE — OPERATIVE REPORT
OPERATIVE REPORT   PATIENT NAME: Marylen Channel  MRN: [id number]  DATE OF OPERATION: 12/12/2022  PREOPERATIVE DIAGNOSIS:   1. History of short segment Roa's with high-grade dysplasia status post endoscopic mucosal resection on April 4, 2022 followed by radiofrequency on June 6, August 8, and October 10 of 2022. The patient is here today for reevaluation and possible radiofrequency ablation  POSTOPERATIVE DIAGNOSES:  1. Irregular squamocolumnar junction. 2. Sliding hiatal hernia  3. Distal esophageal tiny island of columnar epithelium at 39 cm from the incisors  PROCEDURE PERFORMED: Upper endoscopy with biopsy  SURGEON: Nela Jama MD   MEDICATIONS: None    ANESTHESIA: MAC  CONSENT: Informed and obtained from the patient  SPECIMEN: GE junction biopsy at 2 o'clock position, 4 o'clock position, 7 o'clock position, 11 o'clock position, and tiny columnar island in the distal esophagus at 39 cm from the incisors  COMPLICATIONS: None immediately apparent  PROCEDURE AND FINDINGS:   After the risks and benefits of the procedure were discussed with the patient and all questions were answered, the patient signed informed consent for the procedure. I discussed the rationale for the procedure as well as the risks and benefits, with the risks including but not limited to bleeding, perforation, medication adverse event and missed lesions. He was placed in the left lateral position and once an adequate level of  sedation was achieved, the lubricated tip of an Olympus video gastroscope was introduced into the mouth and the esophagus was intubated under direct visualization. A complete examination of the esophagus, stomach and duodenum was performed including retroflexion in the stomach to view the cardia and fundus. The endoscope was straightened and removed, and the procedure was completed. The patient tolerated the procedure well. There were no immediate complications.  The patient was given a written copy of their results at discharge. The distal end clear detachable cap was fitted onto the tip of the scope prior to inserting it into the esophagus. FINDINGS:  1.  No obvious inflammation or ulceration seen in the esophagus. The squamocolumnar junction appeared to be irregular. Biopsies were taken from 2:00, 4:00, 7:00, and 11 o'clock position and placed in separate jars. The esophageal mucosa was examined with narrowband imaging as well as high definition white light. The previously performed endoscopic mucosal resection site appeared to be well-healed. At 39 cm from the incisors to the right of the prior EMR site there was a tiny island measuring approximately 1 mm in maximum diameter of columnar epithelium. That was biopsied separately. Appeared to be slightly raised. Sliding hiatal hernia, Hill grade 3 was noted. Measured approximately 3 cm in length. 2. Normal stomach throughout with no evidence of ulcers, masses or other abnormalities. Retroflexion revealed a normal cardia and fundus. The antrum was normal and the pylorus was patent. 3. Normal duodenum to the second portion with no ulcers or masses seen. IMPRESSION:  1. Findings described above  RECOMMENDATIONS:  1. Await final biopsy results. We will determine subsequent follow-up based on that. He may require endoscopic mucosal resection of the island of columnar epithelium and possibly any areas of Roa's at the level of the GE junction if needed. 2. Continue PPI for now.   We will titrate according to symptoms control once the patient complete endoscopic ablative therapy  Shari Castillo MD

## 2022-12-12 NOTE — ANESTHESIA POSTPROCEDURE EVALUATION
[facility] Patient Status:  Hospital Outpatient Surgery   Age/Gender 47year old male MRN [id number]   Location [address] [address] Attending Jose Angel Reagan MD   Hosp Day # 0 PCP Marcela Gonzalez MD       Anesthesia Post-op Note    ESOPHAGOGASTRODUODENOSCOPY WITH BIOPSIES    Procedure Summary     Date: 12/12/22 Room / Location: [facility] ENDOSCOPY 02 / [facility] ENDOSCOPY    Anesthesia Start: 5130 Anesthesia Stop: 5504    Procedure: ESOPHAGOGASTRODUODENOSCOPY WITH BIOPSIES Diagnosis: (hiatal hernia, irregular zline, tiny barretts island)    Surgeons: Jose Angel Reagan MD Anesthesiologist: Annabel Munoz MD    Anesthesia Type: MAC ASA Status: 2          Anesthesia Type: MAC    Vitals Value Taken Time   /80 12/12/22 0834   Temp   12/12/22 0836   Pulse 80 12/12/22 0835   Resp 14 12/12/22 0836   SpO2 97 % 12/12/22 0835   Vitals shown include unvalidated device data. Patient Location: Endoscopy    Anesthesia Type: MAC    Airway Patency: patent    Postop Pain Control: adequate    Mental Status: mildly sedated but able to meaningfully participate in the post-anesthesia evaluation    Nausea/Vomiting: none    Cardiopulmonary/Hydration status: stable euvolemic    Complications: no apparent anesthesia related complications    Postop vital signs: stable    Dental Exam: Unchanged from Preop    Patient to be discharged home when criteria met.

## 2023-01-13 ENCOUNTER — LAB ENCOUNTER (OUTPATIENT)
Dept: LAB | Age: 53
End: 2023-01-13
Attending: INTERNAL MEDICINE
Payer: COMMERCIAL

## 2023-01-13 DIAGNOSIS — Z20.822 ENCOUNTER FOR PREOPERATIVE SCREENING LABORATORY TESTING FOR COVID-19 VIRUS: ICD-10-CM

## 2023-01-13 DIAGNOSIS — Z01.812 ENCOUNTER FOR PREOPERATIVE SCREENING LABORATORY TESTING FOR COVID-19 VIRUS: ICD-10-CM

## 2023-01-14 LAB — SARS-COV-2 RNA RESP QL NAA+PROBE: NOT DETECTED

## 2023-01-16 ENCOUNTER — ANESTHESIA EVENT (OUTPATIENT)
Dept: ENDOSCOPY | Facility: HOSPITAL | Age: 53
End: 2023-01-16
Payer: COMMERCIAL

## 2023-01-16 ENCOUNTER — HOSPITAL ENCOUNTER (OUTPATIENT)
Facility: HOSPITAL | Age: 53
Setting detail: HOSPITAL OUTPATIENT SURGERY
Discharge: HOME | End: 2023-01-16
Attending: INTERNAL MEDICINE | Admitting: INTERNAL MEDICINE
Payer: COMMERCIAL

## 2023-01-16 ENCOUNTER — ANESTHESIA (OUTPATIENT)
Dept: ENDOSCOPY | Facility: HOSPITAL | Age: 53
End: 2023-01-16
Payer: COMMERCIAL

## 2023-01-16 VITALS
HEART RATE: 81 BPM | WEIGHT: 230 LBS | OXYGEN SATURATION: 96 % | SYSTOLIC BLOOD PRESSURE: 160 MMHG | BODY MASS INDEX: 30.48 KG/M2 | DIASTOLIC BLOOD PRESSURE: 89 MMHG | HEIGHT: 73 IN | TEMPERATURE: 98 F | RESPIRATION RATE: 18 BRPM

## 2023-01-16 DIAGNOSIS — Z01.812 ENCOUNTER FOR PREOPERATIVE SCREENING LABORATORY TESTING FOR COVID-19 VIRUS: Primary | ICD-10-CM

## 2023-01-16 DIAGNOSIS — Z20.822 ENCOUNTER FOR PREOPERATIVE SCREENING LABORATORY TESTING FOR COVID-19 VIRUS: Primary | ICD-10-CM

## 2023-01-16 LAB — GLUCOSE BLD-MCNC: 195 MG/DL (ref 70–99)

## 2023-01-16 PROCEDURE — 0D538ZZ DESTRUCTION OF LOWER ESOPHAGUS, VIA NATURAL OR ARTIFICIAL OPENING ENDOSCOPIC: ICD-10-PCS | Performed by: INTERNAL MEDICINE

## 2023-01-16 PROCEDURE — 82962 GLUCOSE BLOOD TEST: CPT

## 2023-01-16 RX ORDER — SODIUM CHLORIDE, SODIUM LACTATE, POTASSIUM CHLORIDE, CALCIUM CHLORIDE 600; 310; 30; 20 MG/100ML; MG/100ML; MG/100ML; MG/100ML
INJECTION, SOLUTION INTRAVENOUS CONTINUOUS
Status: DISCONTINUED | OUTPATIENT
Start: 2023-01-16 | End: 2023-01-16

## 2023-01-16 RX ORDER — LIDOCAINE HYDROCHLORIDE 10 MG/ML
INJECTION, SOLUTION EPIDURAL; INFILTRATION; INTRACAUDAL; PERINEURAL AS NEEDED
Status: DISCONTINUED | OUTPATIENT
Start: 2023-01-16 | End: 2023-01-16 | Stop reason: SURG

## 2023-01-16 RX ORDER — NICOTINE POLACRILEX 4 MG
15 LOZENGE BUCCAL
Status: DISCONTINUED | OUTPATIENT
Start: 2023-01-16 | End: 2023-01-16

## 2023-01-16 RX ORDER — NICOTINE POLACRILEX 4 MG
30 LOZENGE BUCCAL
Status: DISCONTINUED | OUTPATIENT
Start: 2023-01-16 | End: 2023-01-16

## 2023-01-16 RX ORDER — NALOXONE HYDROCHLORIDE 0.4 MG/ML
80 INJECTION, SOLUTION INTRAMUSCULAR; INTRAVENOUS; SUBCUTANEOUS AS NEEDED
Status: DISCONTINUED | OUTPATIENT
Start: 2023-01-16 | End: 2023-01-16

## 2023-01-16 RX ORDER — DEXTROSE MONOHYDRATE 25 G/50ML
50 INJECTION, SOLUTION INTRAVENOUS
Status: DISCONTINUED | OUTPATIENT
Start: 2023-01-16 | End: 2023-01-16

## 2023-01-16 RX ADMIN — SODIUM CHLORIDE, SODIUM LACTATE, POTASSIUM CHLORIDE, CALCIUM CHLORIDE: 600; 310; 30; 20 INJECTION, SOLUTION INTRAVENOUS at 08:22:00

## 2023-01-16 RX ADMIN — LIDOCAINE HYDROCHLORIDE 50 MG: 10 INJECTION, SOLUTION EPIDURAL; INFILTRATION; INTRACAUDAL; PERINEURAL at 08:33:00

## 2023-01-16 RX ADMIN — SODIUM CHLORIDE, SODIUM LACTATE, POTASSIUM CHLORIDE, CALCIUM CHLORIDE: 600; 310; 30; 20 INJECTION, SOLUTION INTRAVENOUS at 08:51:00

## 2023-01-16 NOTE — ANESTHESIA POSTPROCEDURE EVALUATION
[facility] Patient Status:  Hospital Outpatient Surgery   Age/Gender 47year old male MRN [id number]   Location [address] [address] Attending Evelio Gilbert MD   Hosp Day # 0 PCP Nenita Pride MD       Anesthesia Post-op Note    ESOPHAGOGASTRODUODENOSCOPY WITH RADIOFREQUENCY ABLATION    Procedure Summary     Date: 01/16/23 Room / Location: [facility] ENDOSCOPY 02 / [facility] ENDOSCOPY    Anesthesia Start: 0825 Anesthesia Stop: 2931    Procedure: ESOPHAGOGASTRODUODENOSCOPY WITH RADIOFREQUENCY ABLATION Diagnosis: (Hiatal Hernia, Irregular Z-line)    Surgeons: Evelio Gilbert MD Anesthesiologist: Ashu Degroot MD    Anesthesia Type: MAC ASA Status: 2          Anesthesia Type: MAC    Vitals Value Taken Time   /95 01/16/23 0849   Temp  01/16/23 0851   Pulse 95 01/16/23 0850   Resp 18 01/16/23 0851   SpO2 96 % 01/16/23 0850   Vitals shown include unvalidated device data. Patient Location: Endoscopy    Anesthesia Type: MAC    Airway Patency: patent    Postop Pain Control: adequate    Mental Status: mildly sedated but able to meaningfully participate in the post-anesthesia evaluation    Nausea/Vomiting: none    Cardiopulmonary/Hydration status: stable euvolemic    Complications: no apparent anesthesia related complications    Postop vital signs: stable    Dental Exam: Unchanged from Preop    Patient to be discharged home when criteria met.

## 2023-01-16 NOTE — OPERATIVE REPORT
OPERATIVE REPORT   PATIENT NAME: Katharina Patel  MRN: [id number]  DATE OF OPERATION: 1/16/2023  PREOPERATIVE DIAGNOSIS:   1. 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 Recent follow-up upper endoscopy revealed in the area of residual Roa's without dysplasia at 2 o'clock position at the level of the GE junction. He is here for radiofrequency ablation. POSTOPERATIVE DIAGNOSES:  1. Irregular squamocolumnar junction status post RFA  2. Sliding hiatal hernia, Hill grade 3  PROCEDURE PERFORMED: Upper endoscopy with radiofrequency ablation  SURGEON: Will Marina MD   MEDICATIONS:  none    ANESTHESIA: MAC anesthesia  CONSENT: Informed and obtained from the patient  SPECIMEN: None  COMPLICATIONS: None immediately apparent  PROCEDURE AND FINDINGS:   After the risks and benefits of the procedure were discussed with the patient and all questions were answered, the patient signed informed consent for the procedure. I discussed the rationale for the procedure as well as the risks and benefits, with the risks including but not limited to bleeding, perforation, medication adverse event and missed lesions. He was placed in the left lateral position and once an adequate level of  sedation was achieved, the lubricated tip of an Olympus video gastroscope was introduced into the mouth and the esophagus was intubated under direct visualization. A complete examination of the esophagus, stomach and duodenum was performed including retroflexion in the stomach to view the cardia and fundus. The endoscope was straightened and removed, and the procedure was completed. The patient tolerated the procedure well. There were no immediate complications. The patient was given a written copy of their results at discharge. The soft oblique distal end detachable cap was fitted onto the tip of the scope prior to inserting it into the esophagus    FINDINGS:  1.  Normal esophagus with no evidence of esophagitis, stricture, ulceration, mass or other abnormalities. The squamocolumnar junction was irregular. Utilizing through-the-scope channel RFA focal device the area between 12 and 3 o'clock position was treated. Coagulum was scraped off a second treatment was applied. Sliding hiatal hernia, Hill grade 3 was noted. Approximately 3 cm length. 2. Normal stomach throughout with no evidence of ulcers, masses or other abnormalities. Retroflexion revealed a normal cardia and fundus. The antrum was normal and the pylorus was patent. 3. Normal duodenum to the second portion with no ulcers or masses seen. IMPRESSION:  1. Findings described above  RECOMMENDATIONS:  1. Clear liquid diet for the first meal then advance to regular as tolerated. 2. Continue with PPI twice daily. 3. Repeat upper endoscopy with biopsies in 2 months.   Matilda Miller MD

## 2023-01-16 NOTE — DISCHARGE INSTRUCTIONS
Home Care Instructions for Gastroscopy with Sedation    Diet:  - Resume your regular diet as tolerated unless otherwise instructed. - Start with light meals to minimize bloating.  - Do not drink alcohol today. Medication:  - If you have questions about resuming your normal medications, please contact your Primary Care Physician. Activities:  - Take it easy today. Do not return to work today. - Do not drive today. - Do not operate any machinery today (including kitchen equipment). Gastroscopy:  - You may have a sore throat for 2-3 days following the exam. This is normal. Gargling with warm salt water (1/2 tsp salt to 1 glass warm water) or using throat lozenges will help. - If you experience any sharp pain in your neck, abdomen or chest, vomiting of blood, oral temperature over 100 degrees Fahrenheit, light-headedness or dizziness, or any other problems, contact your doctor. **If unable to reach your doctor, please go to the [facility] Emergency Room**    - Your referring physician will receive a full report of your examination.  - If you do not hear from your doctor's office within two weeks of your biopsy, please call them for your results. You may be able to see your laboratory results in [facility] between 4 and 7 business days. In some cases, your physician may not have viewed the results before they are released to [address] If you have questions regarding your results contact the physician who ordered the test/exam by phone or via [address] by choosing \"Ask a Medical Question. \"        Start with clear liquid diet for the first meal then advance to regular  You will need repeat upper endoscopy in 2 months, please call Dr. Hermelindo Atkins office to schedule it

## 2023-03-08 RX ORDER — GLIMEPIRIDE 2 MG/1
2 TABLET ORAL
COMMUNITY

## 2023-03-17 ENCOUNTER — LAB ENCOUNTER (OUTPATIENT)
Dept: LAB | Age: 53
End: 2023-03-17
Attending: INTERNAL MEDICINE
Payer: COMMERCIAL

## 2023-03-17 DIAGNOSIS — Z01.818 PRE-OP TESTING: ICD-10-CM

## 2023-03-18 LAB — SARS-COV-2 RNA RESP QL NAA+PROBE: NOT DETECTED

## 2023-03-20 ENCOUNTER — ANESTHESIA (OUTPATIENT)
Dept: ENDOSCOPY | Facility: HOSPITAL | Age: 53
End: 2023-03-20
Payer: COMMERCIAL

## 2023-03-20 ENCOUNTER — HOSPITAL ENCOUNTER (OUTPATIENT)
Facility: HOSPITAL | Age: 53
Setting detail: HOSPITAL OUTPATIENT SURGERY
Discharge: HOME | End: 2023-03-20
Attending: INTERNAL MEDICINE | Admitting: INTERNAL MEDICINE
Payer: COMMERCIAL

## 2023-03-20 ENCOUNTER — ANESTHESIA EVENT (OUTPATIENT)
Dept: ENDOSCOPY | Facility: HOSPITAL | Age: 53
End: 2023-03-20
Payer: COMMERCIAL

## 2023-03-20 VITALS
HEIGHT: 73 IN | RESPIRATION RATE: 16 BRPM | WEIGHT: 230 LBS | OXYGEN SATURATION: 98 % | TEMPERATURE: 98 F | BODY MASS INDEX: 30.48 KG/M2 | HEART RATE: 73 BPM | DIASTOLIC BLOOD PRESSURE: 86 MMHG | SYSTOLIC BLOOD PRESSURE: 142 MMHG

## 2023-03-20 DIAGNOSIS — Z01.818 PRE-OP TESTING: Primary | ICD-10-CM

## 2023-03-20 LAB — GLUCOSE BLD-MCNC: 192 MG/DL (ref 70–99)

## 2023-03-20 PROCEDURE — 0DB48ZX EXCISION OF ESOPHAGOGASTRIC JUNCTION, VIA NATURAL OR ARTIFICIAL OPENING ENDOSCOPIC, DIAGNOSTIC: ICD-10-PCS | Performed by: INTERNAL MEDICINE

## 2023-03-20 PROCEDURE — 82962 GLUCOSE BLOOD TEST: CPT

## 2023-03-20 PROCEDURE — 88305 TISSUE EXAM BY PATHOLOGIST: CPT | Performed by: INTERNAL MEDICINE

## 2023-03-20 RX ORDER — NICOTINE POLACRILEX 4 MG
15 LOZENGE BUCCAL
Status: DISCONTINUED | OUTPATIENT
Start: 2023-03-20 | End: 2023-03-20

## 2023-03-20 RX ORDER — SODIUM CHLORIDE, SODIUM LACTATE, POTASSIUM CHLORIDE, CALCIUM CHLORIDE 600; 310; 30; 20 MG/100ML; MG/100ML; MG/100ML; MG/100ML
INJECTION, SOLUTION INTRAVENOUS CONTINUOUS
Status: DISCONTINUED | OUTPATIENT
Start: 2023-03-20 | End: 2023-03-20

## 2023-03-20 RX ORDER — HYDROMORPHONE HYDROCHLORIDE 1 MG/ML
0.2 INJECTION, SOLUTION INTRAMUSCULAR; INTRAVENOUS; SUBCUTANEOUS EVERY 5 MIN PRN
Status: DISCONTINUED | OUTPATIENT
Start: 2023-03-20 | End: 2023-03-20

## 2023-03-20 RX ORDER — LIDOCAINE HYDROCHLORIDE 10 MG/ML
INJECTION, SOLUTION EPIDURAL; INFILTRATION; INTRACAUDAL; PERINEURAL AS NEEDED
Status: DISCONTINUED | OUTPATIENT
Start: 2023-03-20 | End: 2023-03-20 | Stop reason: SURG

## 2023-03-20 RX ORDER — NALOXONE HYDROCHLORIDE 0.4 MG/ML
80 INJECTION, SOLUTION INTRAMUSCULAR; INTRAVENOUS; SUBCUTANEOUS AS NEEDED
Status: DISCONTINUED | OUTPATIENT
Start: 2023-03-20 | End: 2023-03-20

## 2023-03-20 RX ORDER — DEXTROSE MONOHYDRATE 25 G/50ML
50 INJECTION, SOLUTION INTRAVENOUS
Status: DISCONTINUED | OUTPATIENT
Start: 2023-03-20 | End: 2023-03-20

## 2023-03-20 RX ORDER — HYDROMORPHONE HYDROCHLORIDE 1 MG/ML
0.6 INJECTION, SOLUTION INTRAMUSCULAR; INTRAVENOUS; SUBCUTANEOUS EVERY 5 MIN PRN
Status: DISCONTINUED | OUTPATIENT
Start: 2023-03-20 | End: 2023-03-20

## 2023-03-20 RX ORDER — NICOTINE POLACRILEX 4 MG
30 LOZENGE BUCCAL
Status: DISCONTINUED | OUTPATIENT
Start: 2023-03-20 | End: 2023-03-20

## 2023-03-20 RX ORDER — HYDROMORPHONE HYDROCHLORIDE 1 MG/ML
0.4 INJECTION, SOLUTION INTRAMUSCULAR; INTRAVENOUS; SUBCUTANEOUS EVERY 5 MIN PRN
Status: DISCONTINUED | OUTPATIENT
Start: 2023-03-20 | End: 2023-03-20

## 2023-03-20 RX ADMIN — SODIUM CHLORIDE, SODIUM LACTATE, POTASSIUM CHLORIDE, CALCIUM CHLORIDE: 600; 310; 30; 20 INJECTION, SOLUTION INTRAVENOUS at 08:22:00

## 2023-03-20 RX ADMIN — SODIUM CHLORIDE, SODIUM LACTATE, POTASSIUM CHLORIDE, CALCIUM CHLORIDE: 600; 310; 30; 20 INJECTION, SOLUTION INTRAVENOUS at 08:00:00

## 2023-03-20 RX ADMIN — LIDOCAINE HYDROCHLORIDE 25 MG: 10 INJECTION, SOLUTION EPIDURAL; INFILTRATION; INTRACAUDAL; PERINEURAL at 08:02:00

## 2023-03-20 NOTE — OPERATIVE REPORT
OPERATIVE REPORT   PATIENT NAME: Celine Torres  MRN: [id number]  DATE OF OPERATION: 3/20/2023  PREOPERATIVE DIAGNOSIS:   1. Roa's esophagus with high-grade dysplasia status post endoscopic treatment in the past.  He is here for surveillance upper endoscopy  POSTOPERATIVE DIAGNOSES:  1. Irregular Z-line. 2. Sliding hiatal hernia  PROCEDURE PERFORMED: Upper endoscopy with biopsy  SURGEON: Yeni Dial MD   MEDICATIONS: None    ANESTHESIA: MAC  CONSENT: Informed and obtained from the patient  SPECIMEN: GE junction at 2 o'clock position, GE junction random biopsies  COMPLICATIONS: None immediately apparent  PROCEDURE AND FINDINGS:   After the risks and benefits of the procedure were discussed with the patient and all questions were answered, the patient signed informed consent for the procedure. I discussed the rationale for the procedure as well as the risks and benefits, with the risks including but not limited to bleeding, perforation, medication adverse event and missed lesions. He was placed in the left lateral position and once an adequate level of  sedation was achieved, the lubricated tip of an Olympus video gastroscope was introduced into the mouth and the esophagus was intubated under direct visualization. A complete examination of the esophagus, stomach and duodenum was performed including retroflexion in the stomach to view the cardia and fundus. The endoscope was straightened and removed, and the procedure was completed. The patient tolerated the procedure well. There were no immediate complications. The patient was given a written copy of their results at discharge. FINDINGS:  1. The squamocolumnar junction appeared to be slightly irregular. Biopsies were taken from 2 o'clock position as well as random biopsies from the rest of the GE junction. No obvious suspicious areas seen. The esophageal mucosa was examined with narrowband imaging as well as high definition white light.   There was a 3 cm long sliding hiatal hernia extending from 41 cm from the incisors down to 44 cm from the incisors. 2. Normal stomach throughout with no evidence of ulcers, masses or other abnormalities. Retroflexion revealed a normal cardia and fundus. The antrum was normal and the pylorus was patent. 3. Normal duodenum to the second portion with no ulcers or masses seen. IMPRESSION:  1. As above  RECOMMENDATIONS:  1.  Await final biopsy results, will determine subsequent surveillance and or the need for endoscopic ablation based on the final pathology  Jackelyn Draper MD

## 2023-03-20 NOTE — DISCHARGE INSTRUCTIONS
Home Care Instructions for Gastroscopy with Sedation    Diet:  - Resume your regular diet as tolerated unless otherwise instructed. - Start with light meals to minimize bloating.  - Do not drink alcohol today. Medication:  - If you have questions about resuming your normal medications, please contact your Primary Care Physician. Activities:  - Take it easy today. Do not return to work today. - Do not drive today. - Do not operate any machinery today (including kitchen equipment). Gastroscopy:  - You may have a sore throat for 2-3 days following the exam. This is normal. Gargling with warm salt water (1/2 tsp salt to 1 glass warm water) or using throat lozenges will help. - If you experience any sharp pain in your neck, abdomen or chest, vomiting of blood, oral temperature over 100 degrees Fahrenheit, light-headedness or dizziness, or any other problems, contact your doctor. **If unable to reach your doctor, please go to the [facility] Emergency Room**    - Your referring physician will receive a full report of your examination.  - If you do not hear from your doctor's office within two weeks of your biopsy, please call them for your results. You may be able to see your laboratory results in [facility] between 4 and 7 business days. In some cases, your physician may not have viewed the results before they are released to [address] If you have questions regarding your results contact the physician who ordered the test/exam by phone or via [address] by choosing \"Ask a Medical Question. \"

## 2023-04-14 RX ORDER — LISINOPRIL AND HYDROCHLOROTHIAZIDE 20; 12.5 MG/1; MG/1
1 TABLET ORAL DAILY
COMMUNITY

## 2023-05-01 ENCOUNTER — ANESTHESIA EVENT (OUTPATIENT)
Dept: ENDOSCOPY | Facility: HOSPITAL | Age: 53
End: 2023-05-01
Payer: COMMERCIAL

## 2023-05-01 ENCOUNTER — ANESTHESIA (OUTPATIENT)
Dept: ENDOSCOPY | Facility: HOSPITAL | Age: 53
End: 2023-05-01
Payer: COMMERCIAL

## 2023-05-01 ENCOUNTER — HOSPITAL ENCOUNTER (OUTPATIENT)
Facility: HOSPITAL | Age: 53
Setting detail: HOSPITAL OUTPATIENT SURGERY
Discharge: HOME | End: 2023-05-01
Attending: INTERNAL MEDICINE | Admitting: INTERNAL MEDICINE
Payer: COMMERCIAL

## 2023-05-01 VITALS
WEIGHT: 230 LBS | OXYGEN SATURATION: 97 % | BODY MASS INDEX: 30.48 KG/M2 | HEIGHT: 73 IN | DIASTOLIC BLOOD PRESSURE: 90 MMHG | RESPIRATION RATE: 16 BRPM | TEMPERATURE: 98 F | SYSTOLIC BLOOD PRESSURE: 159 MMHG | HEART RATE: 75 BPM

## 2023-05-01 LAB — GLUCOSE BLD-MCNC: 151 MG/DL (ref 70–99)

## 2023-05-01 PROCEDURE — 82962 GLUCOSE BLOOD TEST: CPT

## 2023-05-01 PROCEDURE — 3E0G8GC INTRODUCTION OF OTHER THERAPEUTIC SUBSTANCE INTO UPPER GI, VIA NATURAL OR ARTIFICIAL OPENING ENDOSCOPIC: ICD-10-PCS | Performed by: INTERNAL MEDICINE

## 2023-05-01 PROCEDURE — 88305 TISSUE EXAM BY PATHOLOGIST: CPT | Performed by: INTERNAL MEDICINE

## 2023-05-01 PROCEDURE — 0DB48ZX EXCISION OF ESOPHAGOGASTRIC JUNCTION, VIA NATURAL OR ARTIFICIAL OPENING ENDOSCOPIC, DIAGNOSTIC: ICD-10-PCS | Performed by: INTERNAL MEDICINE

## 2023-05-01 RX ORDER — SODIUM CHLORIDE, SODIUM LACTATE, POTASSIUM CHLORIDE, CALCIUM CHLORIDE 600; 310; 30; 20 MG/100ML; MG/100ML; MG/100ML; MG/100ML
INJECTION, SOLUTION INTRAVENOUS CONTINUOUS
Status: DISCONTINUED | OUTPATIENT
Start: 2023-05-01 | End: 2023-05-01

## 2023-05-01 RX ORDER — NALOXONE HYDROCHLORIDE 0.4 MG/ML
80 INJECTION, SOLUTION INTRAMUSCULAR; INTRAVENOUS; SUBCUTANEOUS AS NEEDED
Status: DISCONTINUED | OUTPATIENT
Start: 2023-05-01 | End: 2023-05-01

## 2023-05-01 RX ORDER — NICOTINE POLACRILEX 4 MG
15 LOZENGE BUCCAL
Status: DISCONTINUED | OUTPATIENT
Start: 2023-05-01 | End: 2023-05-01

## 2023-05-01 RX ORDER — DEXTROSE MONOHYDRATE 25 G/50ML
50 INJECTION, SOLUTION INTRAVENOUS
Status: DISCONTINUED | OUTPATIENT
Start: 2023-05-01 | End: 2023-05-01

## 2023-05-01 RX ORDER — LIDOCAINE HYDROCHLORIDE 10 MG/ML
INJECTION, SOLUTION EPIDURAL; INFILTRATION; INTRACAUDAL; PERINEURAL AS NEEDED
Status: DISCONTINUED | OUTPATIENT
Start: 2023-05-01 | End: 2023-05-01 | Stop reason: SURG

## 2023-05-01 RX ORDER — NICOTINE POLACRILEX 4 MG
30 LOZENGE BUCCAL
Status: DISCONTINUED | OUTPATIENT
Start: 2023-05-01 | End: 2023-05-01

## 2023-05-01 RX ADMIN — LIDOCAINE HYDROCHLORIDE 50 MG: 10 INJECTION, SOLUTION EPIDURAL; INFILTRATION; INTRACAUDAL; PERINEURAL at 08:53:00

## 2023-05-01 NOTE — DISCHARGE INSTRUCTIONS
Clear liquid diet for today then advance to soft for 2 days then as tolerated thereafter. Avoid crunchy food for 14 days. Home Care Instructions for Gastroscopy with Sedation    Diet:  - Resume your regular diet as tolerated unless otherwise instructed. - Start with light meals to minimize bloating.  - Do not drink alcohol today. Medication:  - If you have questions about resuming your normal medications, please contact your Primary Care Physician. Activities:  - Take it easy today. Do not return to work today. - Do not drive today. - Do not operate any machinery today (including kitchen equipment). Gastroscopy:  - You may have a sore throat for 2-3 days following the exam. This is normal. Gargling with warm salt water (1/2 tsp salt to 1 glass warm water) or using throat lozenges will help. - If you experience any sharp pain in your neck, abdomen or chest, vomiting of blood, oral temperature over 100 degrees Fahrenheit, light-headedness or dizziness, or any other problems, contact your doctor. **If unable to reach your doctor, please go to the [facility] Emergency Room**    - Your referring physician will receive a full report of your examination.  - If you do not hear from your doctor's office within two weeks of your biopsy, please call them for your results. You may be able to see your laboratory results in [facility] between 4 and 7 business days. In some cases, your physician may not have viewed the results before they are released to [address] If you have questions regarding your results contact the physician who ordered the test/exam by phone or via [address] by choosing \"Ask a Medical Question. \"

## 2023-05-01 NOTE — OPERATIVE REPORT
OPERATIVE REPORT   PATIENT NAME: Sav Adam  MRN: [id number]  DATE OF OPERATION: 5/1/2023  PREOPERATIVE DIAGNOSIS:   1. Roa's with low-grade dysplasia  POSTOPERATIVE DIAGNOSES:  1. Irregular squamocolumnar junction status post EMR. 2. Sliding hiatal hernia  PROCEDURE PERFORMED: Upper endoscopy with endoscopic ulcer resection  SURGEON: Monika Yadav MD   MEDICATIONS: None    ANESTHESIA: MAC  CONSENT: The potential risks including but not limited to perforation, bleeding, infection, medication reaction, complication leading up prolonged hospital stay or needing surgery all discussed with him in details. He was given opportunity ask questions and all his questions were answered. Alternatives and options were all discussed with him. He signed from this consent  SPECIMEN: GE junction x4 pieces  COMPLICATIONS: None immediately apparent  PROCEDURE AND FINDINGS:   After the risks and benefits of the procedure were discussed with the patient and all questions were answered, the patient signed informed consent for the procedure. I discussed the rationale for the procedure as well as the risks and benefits, with the risks including but not limited to bleeding, perforation, medication adverse event and missed lesions. He was placed in the left lateral position and once an adequate level of  sedation was achieved, the lubricated tip of an Olympus video gastroscope was introduced into the mouth and the esophagus was intubated under direct visualization. A complete examination of the esophagus, stomach and duodenum was performed including retroflexion in the stomach to view the cardia and fundus. The endoscope was straightened and removed, and the procedure was completed. The patient tolerated the procedure well. There were no immediate complications. The patient was given a written copy of their results at discharge.     The Olympus EMR hard oblique cap was fitted onto the tip of the scope prior to inserting it into the esophagus    FINDINGS:  1. The squamocolumnar junction appeared to be regular. The area between proximately 12 o'clock position and 5 o'clock position was removed with the use of the Olympus EMR hard oblique In 4 pieces utilizing EMR technique. The mucosa was lifted with submucosal injection of saline, and resected with a crescent shape EMR cautery snare. Few submucosal blood vessels were cauterized to minimize post EMR bleed. Specimens were retrieved with a Baxter net basket. Once again large sliding hiatal hernia was seen. Gastroesophageal junction flap valve Hill grade 3  2. Normal stomach throughout with no evidence of ulcers, masses or other abnormalities. Retroflexion revealed a normal cardia and fundus. The antrum was normal and the pylorus was patent. 3. Normal duodenum to the second portion with no ulcers or masses seen. IMPRESSION:  1. Findings described above  RECOMMENDATIONS:  1. Clear liquid diet for today then advance to soft for 2 days then as tolerated. 2. Continue with PPI.   3. Check final biopsy results most likely repeat upper endoscopy in 2 months  Clary Alexander MD

## 2023-05-01 NOTE — ANESTHESIA POSTPROCEDURE EVALUATION
[address] [facility] Patient Status:  Hospital Outpatient Surgery   Age/Gender 47year old male MRN [id number]   Location [address] [address] Attending Jose Angel Reagan MD   Hosp Day # 0 PCP Marcela Gonzalez MD       Anesthesia Post-op Note    ESOPHAGOGASTRODUODENOSCOPY (EGD) WITH ENDOSCOPIC MUCOSAL RESECTION and Gold Probe Cautherization    Procedure Summary     Date: 05/01/23 Room / Location: [facility] ENDOSCOPY 02 / [address] [facility] ENDOSCOPY    Anesthesia Start: 0780 Anesthesia Stop:     Procedure: ESOPHAGOGASTRODUODENOSCOPY (EGD) WITH ENDOSCOPIC MUCOSAL RESECTION and Gold Probe Cautherization Diagnosis: (hiatal hernia, irregular Z-line)    Surgeons: Jose Angel Reagan MD Anesthesiologist: Shay Barkley MD    Anesthesia Type: MAC ASA Status: 2          Anesthesia Type: MAC    Vitals Value Taken Time   /86 05/01/23 0927   Temp  05/01/23 0927   Pulse 95 05/01/23 0926   Resp 18 05/01/23 0926   SpO2 95 % 05/01/23 0926   Vitals shown include unvalidated device data. Patient Location: PACU    Anesthesia Type: MAC    Airway Patency: patent    Postop Pain Control: adequate    Mental Status: mildly sedated but able to meaningfully participate in the post-anesthesia evaluation    Nausea/Vomiting: none    Cardiopulmonary/Hydration status: stable euvolemic    Complications: no apparent anesthesia related complications    Postop vital signs: stable    Dental Exam: Unchanged from Preop    Patient to be discharged home when criteria met.

## 2023-07-31 ENCOUNTER — HOSPITAL ENCOUNTER (OUTPATIENT)
Facility: HOSPITAL | Age: 53
Setting detail: HOSPITAL OUTPATIENT SURGERY
Discharge: HOME | End: 2023-07-31
Attending: INTERNAL MEDICINE | Admitting: INTERNAL MEDICINE
Payer: COMMERCIAL

## 2023-07-31 ENCOUNTER — ANESTHESIA (OUTPATIENT)
Dept: ENDOSCOPY | Facility: HOSPITAL | Age: 53
End: 2023-07-31
Payer: COMMERCIAL

## 2023-07-31 ENCOUNTER — ANESTHESIA EVENT (OUTPATIENT)
Dept: ENDOSCOPY | Facility: HOSPITAL | Age: 53
End: 2023-07-31
Payer: COMMERCIAL

## 2023-07-31 VITALS
BODY MASS INDEX: 30.48 KG/M2 | HEART RATE: 93 BPM | RESPIRATION RATE: 16 BRPM | DIASTOLIC BLOOD PRESSURE: 95 MMHG | SYSTOLIC BLOOD PRESSURE: 157 MMHG | HEIGHT: 73 IN | WEIGHT: 230 LBS | TEMPERATURE: 98 F | OXYGEN SATURATION: 98 %

## 2023-07-31 LAB — GLUCOSE BLD-MCNC: 154 MG/DL (ref 70–99)

## 2023-07-31 PROCEDURE — 88341 IMHCHEM/IMCYTCHM EA ADD ANTB: CPT | Performed by: INTERNAL MEDICINE

## 2023-07-31 PROCEDURE — 82962 GLUCOSE BLOOD TEST: CPT

## 2023-07-31 PROCEDURE — 88305 TISSUE EXAM BY PATHOLOGIST: CPT | Performed by: INTERNAL MEDICINE

## 2023-07-31 PROCEDURE — 88342 IMHCHEM/IMCYTCHM 1ST ANTB: CPT | Performed by: INTERNAL MEDICINE

## 2023-07-31 PROCEDURE — 0DB48ZX EXCISION OF ESOPHAGOGASTRIC JUNCTION, VIA NATURAL OR ARTIFICIAL OPENING ENDOSCOPIC, DIAGNOSTIC: ICD-10-PCS | Performed by: INTERNAL MEDICINE

## 2023-07-31 RX ORDER — SODIUM CHLORIDE, SODIUM LACTATE, POTASSIUM CHLORIDE, CALCIUM CHLORIDE 600; 310; 30; 20 MG/100ML; MG/100ML; MG/100ML; MG/100ML
INJECTION, SOLUTION INTRAVENOUS CONTINUOUS
Status: DISCONTINUED | OUTPATIENT
Start: 2023-07-31 | End: 2023-07-31

## 2023-07-31 RX ORDER — LIDOCAINE HYDROCHLORIDE 10 MG/ML
INJECTION, SOLUTION EPIDURAL; INFILTRATION; INTRACAUDAL; PERINEURAL AS NEEDED
Status: DISCONTINUED | OUTPATIENT
Start: 2023-07-31 | End: 2023-07-31 | Stop reason: SURG

## 2023-07-31 RX ORDER — NICOTINE POLACRILEX 4 MG
30 LOZENGE BUCCAL
Status: DISCONTINUED | OUTPATIENT
Start: 2023-07-31 | End: 2023-07-31

## 2023-07-31 RX ORDER — NALOXONE HYDROCHLORIDE 0.4 MG/ML
80 INJECTION, SOLUTION INTRAMUSCULAR; INTRAVENOUS; SUBCUTANEOUS AS NEEDED
Status: DISCONTINUED | OUTPATIENT
Start: 2023-07-31 | End: 2023-07-31

## 2023-07-31 RX ORDER — NICOTINE POLACRILEX 4 MG
15 LOZENGE BUCCAL
Status: DISCONTINUED | OUTPATIENT
Start: 2023-07-31 | End: 2023-07-31

## 2023-07-31 RX ORDER — DEXTROSE MONOHYDRATE 25 G/50ML
50 INJECTION, SOLUTION INTRAVENOUS
Status: DISCONTINUED | OUTPATIENT
Start: 2023-07-31 | End: 2023-07-31

## 2023-07-31 RX ADMIN — LIDOCAINE HYDROCHLORIDE 50 MG: 10 INJECTION, SOLUTION EPIDURAL; INFILTRATION; INTRACAUDAL; PERINEURAL at 07:40:00

## 2023-07-31 RX ADMIN — SODIUM CHLORIDE, SODIUM LACTATE, POTASSIUM CHLORIDE, CALCIUM CHLORIDE: 600; 310; 30; 20 INJECTION, SOLUTION INTRAVENOUS at 07:36:00

## 2023-07-31 RX ADMIN — LIDOCAINE HYDROCHLORIDE 120 MG: 10 INJECTION, SOLUTION EPIDURAL; INFILTRATION; INTRACAUDAL; PERINEURAL at 07:38:00

## 2023-07-31 NOTE — OPERATIVE REPORT
OPERATIVE REPORT   PATIENT NAME: Solo Howard  MRN: [id number]  DATE OF OPERATION: 7/31/2023  PREOPERATIVE DIAGNOSIS: history of short segment Roa's esophagus (previous patient of Sen Onofre and Dimitrios) with low and high grade dysplasia; s/p EMR with last EGD 5/1/23 with EMR showing no residual Roa's esophagus  POSTOPERATIVE DIAGNOSIS:    1. Irregular Z line at 12 o'clock   2. Ulcerated nodule at 5 o'clock   3.  3cm Hill type IV hiatal hernia  PROCEDURE PERFORMED: Esophagogastroduodenoscopy  SEDATION MEDICATIONS: MAC  MODERATE SEDATION TIME: None. Deep sedation provided by anesthesia  PREPROCEDURE ASSESSMENT: The indication for this procedure is to assess for biopsies. The patient was identified by myself and nursing staff in the exam room. Informed consent was obtained. The patient was seen in clinic and a full H&P was obtained. On brief physical examination, airway is patent. Chest is clear. Heart has regular rate and rhythm. Abdomen is soft, nontender with good bowel sounds. A medication list was taken by nursing today and reviewed by myself. The patient is an ASA grade 2. PROCEDURE NOTE: The procedure was completed without difficulty. The patient tolerated the procedure well. The endoscope was inserted through the mouth and advanced to the level of the duodenum, 3rd portion. Esophagus appeared normal without stricture or esophagitis. A moderate hiatal hernia without Roa's esophagus was noted. Irregularities at the 12 and 5 o'clock were noted and separate biopsies were taken. Stomach was normal in the antrum and the body. Duodenum was normal in the bulb and 2nd duodenum. Retroflexed view in the stomach revealed normal fundus and cardia. There were no immediate complications. FINDINGS   History of Roa's with focal low and high grade dysplasia.   Eradication with Dr. Hao Tate; surveillance endoscopy being done now  RECOMMENDATIONS: if no dysplasia repeat EGD in 6 months with  [place]. DISCHARGE:  On discharge, the patient was given an after-visit summary detailing the procedure, findings, followup plans, and an updated medication list.     Thank you very much for the consultation. I really appreciate it.     Isabella Waggoner MD

## (undated) DEVICE — CATHETER 60 RFA FOCAL

## (undated) DEVICE — 3M™ RED DOT™ MONITORING ELECTRODE WITH FOAM TAPE AND STICKY GEL, 50/BAG, 20/CASE, 72/PLT 2570: Brand: RED DOT™

## (undated) DEVICE — FILTERLINE NASAL ADULT O2/CO2

## (undated) DEVICE — ENDOSCOPY PACK - LOWER: Brand: MEDLINE INDUSTRIES, INC.

## (undated) DEVICE — 1200CC GUARDIAN II: Brand: GUARDIAN

## (undated) DEVICE — KIT ENDO ORCAPOD 160/180/190

## (undated) DEVICE — BLOCK BITE MAXI 60FR

## (undated) DEVICE — ENDOSCOPY PACK UPPER: Brand: MEDLINE INDUSTRIES, INC.

## (undated) DEVICE — FORCEP RADIAL JAW 4

## (undated) DEVICE — CAP ESCP 10.7-10.9MM MED HALO

## (undated) DEVICE — MEDI-VAC NON-CONDUCTIVE SUCTION TUBING: Brand: CARDINAL HEALTH

## (undated) DEVICE — Device: Brand: DEFENDO AIR/WATER/SUCTION AND BIOPSY VALVE

## (undated) DEVICE — CATH GOLD PROBE HEMOGLIDE 7FR

## (undated) DEVICE — Device

## (undated) DEVICE — CAP ENDO DISTAL ATTCHMNT 11.35

## (undated) DEVICE — BIOGUARD CLEANING ADAPTER

## (undated) DEVICE — CATH ENDO BARRX L135 CM

## (undated) DEVICE — "SD-221L-25 DISP.CRESCENT SNARE-25MM": Brand: DISPOSABLE ELECTROSURGICAL SNARE

## (undated) DEVICE — GLOVE SURG SENSICARE SZ 7

## (undated) DEVICE — DISPOSABLE EMR KIT: Brand: DISPOSABLE EMR KIT

## (undated) DEVICE — GIJAW SINGLE-USE BIOPSY FORCEPS WITH NEEDLE: Brand: GIJAW

## (undated) DEVICE — REM POLYHESIVE ADULT PATIENT RETURN ELECTRODE: Brand: VALLEYLAB

## (undated) DEVICE — 10FT COMBINED O2 DELIVERY/CO2 MONITORING. FILTER WITH MICROSTREAM TYPE LUER: Brand: DUAL ADULT NASAL CANNULA

## (undated) DEVICE — NEEDLE CONTRAST INTERJECT 25G

## (undated) DEVICE — NET SPECIMEN RETRIEVAL BLUE

## (undated) DEVICE — TRAP SPEC REMOVAL ETRAP 15CM

## (undated) NOTE — ED AVS SNAPSHOT
Krystina Trejo   MRN: [id number]    Department:  [address] [facility] in [facility]   Date of Visit:  10/25/2018           Disclosure     Insurance plans vary and the physician(s) referred by the ER may not be covered by your plan.  Please con tell this physician (or your personal doctor if your instructions are to return to your personal doctor) about any new or lasting problems. The primary care or specialist physician will see patients referred from the [facility] Emergency Department.  Hammad Robledo

## (undated) NOTE — LETTER
OUTSIDE TESTING RESULT REQUEST     IMPORTANT: FOR YOUR IMMEDIATE ATTENTION  Please FAX all test results listed below to: [phone]       * * * * If testing is NOT complete, arrange with patient A.S.A.P. * * * *      Patient Name: Denia Nichols  Surgery Date: 2022  CSN: [id number]  Medical Record: [id number]   : 1970 - A: 47 y      Sex: male  Surgeon(s):  Julia Chavez MD  Procedure: ESOPHAGOGASTRODUODENOSCOPY (EGD) with radio frequency ablation and/ or endoscopic mucosal resection  Anesthesia Type: MAC     Surgeon: Julia Chavez MD     The following Testing and Time Line are REQUIRED PER ANESTHESIA     COVID PCR needs to be done on 2022 for procedure on 2022      Thank You,   Sent by: Kathy Runner RN Jm  5/13/15